# Patient Record
Sex: MALE | Race: WHITE | Employment: OTHER | URBAN - METROPOLITAN AREA
[De-identification: names, ages, dates, MRNs, and addresses within clinical notes are randomized per-mention and may not be internally consistent; named-entity substitution may affect disease eponyms.]

---

## 2022-03-30 ENCOUNTER — OFFICE VISIT (OUTPATIENT)
Dept: NEUROLOGY | Age: 67
End: 2022-03-30
Payer: MEDICARE

## 2022-03-30 VITALS
DIASTOLIC BLOOD PRESSURE: 61 MMHG | HEIGHT: 68 IN | WEIGHT: 145 LBS | SYSTOLIC BLOOD PRESSURE: 99 MMHG | BODY MASS INDEX: 21.98 KG/M2 | HEART RATE: 68 BPM

## 2022-03-30 DIAGNOSIS — M79.2 NEUROPATHIC PAIN: Primary | ICD-10-CM

## 2022-03-30 DIAGNOSIS — R20.0 BILATERAL NUMBNESS AND TINGLING OF ARMS AND LEGS: ICD-10-CM

## 2022-03-30 DIAGNOSIS — R20.2 BILATERAL NUMBNESS AND TINGLING OF ARMS AND LEGS: ICD-10-CM

## 2022-03-30 PROCEDURE — 4040F PNEUMOC VAC/ADMIN/RCVD: CPT | Performed by: PSYCHIATRY & NEUROLOGY

## 2022-03-30 PROCEDURE — G8484 FLU IMMUNIZE NO ADMIN: HCPCS | Performed by: PSYCHIATRY & NEUROLOGY

## 2022-03-30 PROCEDURE — G8420 CALC BMI NORM PARAMETERS: HCPCS | Performed by: PSYCHIATRY & NEUROLOGY

## 2022-03-30 PROCEDURE — 4004F PT TOBACCO SCREEN RCVD TLK: CPT | Performed by: PSYCHIATRY & NEUROLOGY

## 2022-03-30 PROCEDURE — G8427 DOCREV CUR MEDS BY ELIG CLIN: HCPCS | Performed by: PSYCHIATRY & NEUROLOGY

## 2022-03-30 PROCEDURE — 99204 OFFICE O/P NEW MOD 45 MIN: CPT | Performed by: PSYCHIATRY & NEUROLOGY

## 2022-03-30 PROCEDURE — 1123F ACP DISCUSS/DSCN MKR DOCD: CPT | Performed by: PSYCHIATRY & NEUROLOGY

## 2022-03-30 PROCEDURE — 3017F COLORECTAL CA SCREEN DOC REV: CPT | Performed by: PSYCHIATRY & NEUROLOGY

## 2022-03-30 RX ORDER — MAGNESIUM OXIDE 400 MG/1
400 TABLET ORAL DAILY
COMMUNITY

## 2022-03-30 RX ORDER — ASPIRIN 81 MG/1
81 TABLET ORAL DAILY
COMMUNITY

## 2022-03-30 RX ORDER — PREGABALIN 150 MG/1
CAPSULE ORAL
COMMUNITY
Start: 2022-02-28

## 2022-03-30 RX ORDER — HYDROCODONE BITARTRATE AND ACETAMINOPHEN 10; 325 MG/1; MG/1
TABLET ORAL
COMMUNITY
Start: 2022-03-15

## 2022-03-30 RX ORDER — CALCIUM CARBONATE 500(1250)
500 TABLET ORAL DAILY
COMMUNITY

## 2022-03-30 RX ORDER — DULOXETIN HYDROCHLORIDE 30 MG/1
CAPSULE, DELAYED RELEASE ORAL
COMMUNITY
Start: 2021-11-02

## 2022-03-30 NOTE — PROGRESS NOTES
NEUROLOGY CONSULTATION     Chief Complaint   Patient presents with    New Patient     numbness & tingling        HISTORY OF PRESENT ILLNESS :    Marletta Boas is a 78 y.o. male who is referred by Dr. Christy Odor   History was obtained from patient  Additional history was obtained from his family. Patient was referred for evaluation of tingling numbness as well as pain in his arms and legs. Patient states that he had lumbar disc surgery done about 3 years ago. After the surgery patient developed significant tingling and numbness in his legs which has now spread all over the body. He also has significant chronic pain. Patient has been seen by pain management and is on pain medications. He was started on Lyrica which does not seem to help. Patient feels that the pain pills helped to some extent. Patient states that he has had extensive testing done including MRI brain and lumbar spine as well as lab work and they have not been able to find any definite etiology.     REVIEW OF SYSTEMS    Constitutional:  []   Chills   []  Fatigue   []  Fevers   []  Malaise   []  Weight loss     [x] Denies all of the above    Eyes:  []  Double vision   [x]  Blurry vision     [] Denies all of the above    Ears, nose, mouth, throat, and face:   [x] Hearing loss    []   Hoarseness      []  Snoring    []  Tinnitus       [] Denies all of the above     Respiratory:   []  Cough    [x]  Shortness of breath         [] Denies all of the above     Cardiovascular:   []  Chest pain    []  Exertional chest pressure/discomfort           [] Palpitations    []  Syncope     [x] Denies all of the above    Gastrointestinal:   []  Abdominal pain   [x]  Constipation    []  Diarrhea    [x]   Dysphagia                      [] Denies all of the above    Genitourinary:      []  Frequency   []  Hematuria     []  Urinary incontinence           [x] Denies all of the above Status: Not on file   Intimate Partner Violence:     Fear of Current or Ex-Partner: Not on file    Emotionally Abused: Not on file    Physically Abused: Not on file    Sexually Abused: Not on file   Housing Stability:     Unable to Pay for Housing in the Last Year: Not on file    Number of [place] in the Last Year: Not on file    Unstable Housing in the Last Year: Not on file       PHYSICAL EXAMINATION:  BP 99/61   Pulse 68   Ht 5' 8\" (1.727 m)   Wt 145 lb (65.8 kg)   BMI 22.05 kg/m²   Appearance: Well appearing, well nourished and in no distress  Mental Status Exam: Patient is alert, oriented to person, place and time. Recent and remote memory is normal  Fund of Knowledge is normal  Attention/concentration is normal.   Speech : No dysarthria  Language : No aphasia  Funduscopic Exam: sharp disc margins  Cranial Nerves:   II: Visual fields:  Full to confrontation  III: Pupils:  equal, round, reactive to light  III,IV,VI: Extra Ocular Movements are intact. No nystagmus  V: Facial sensation is intact to pin prick and light touch  VII: Facial strength and movements: intact and symmetric smile,cheek puffing and eyebrow elevation  VIII: Hearing:  Intact to finger rub bilaterally  IX: Palate  elevation is symmetric  XI: Shoulder shrug is intact  XII: Tongue movements are normal  Motor:  Muscle tone and bulk are normal.   Strength is symmetrical 5/5 in all four extremities. Sensory: Vague nondermatomal sensory loss in the arms and legs  Coordination:  Normal  Finger to Nose and Heel to Shin bilaterally    . Reflexes:  DTR +2 and symmetric bilaterally in the arms and the knees and the 1 at the ankles. Plantar response: Flexor bilaterally  Gait: Gait and station is normal. Patient can toe/ heel and tandem walk without difficulty  Romberg: negative  Vascular: No carotid bruit bilaterally        DATA:  MRI lumbar spine done at [facility] showed lumbar spondylosis.   Lab work done at [facility] and

## 2022-05-11 ENCOUNTER — PROCEDURE VISIT (OUTPATIENT)
Dept: NEUROLOGY | Age: 67
End: 2022-05-11
Payer: MEDICARE

## 2022-05-11 ENCOUNTER — OFFICE VISIT (OUTPATIENT)
Dept: NEUROLOGY | Age: 67
End: 2022-05-11

## 2022-05-11 VITALS
WEIGHT: 145 LBS | DIASTOLIC BLOOD PRESSURE: 71 MMHG | HEIGHT: 68 IN | SYSTOLIC BLOOD PRESSURE: 119 MMHG | HEART RATE: 60 BPM | BODY MASS INDEX: 21.98 KG/M2

## 2022-05-11 DIAGNOSIS — M79.2 NEUROPATHIC PAIN: Primary | ICD-10-CM

## 2022-05-11 DIAGNOSIS — R20.2 BILATERAL NUMBNESS AND TINGLING OF ARMS AND LEGS: Primary | ICD-10-CM

## 2022-05-11 DIAGNOSIS — R20.0 BILATERAL NUMBNESS AND TINGLING OF ARMS AND LEGS: Primary | ICD-10-CM

## 2022-05-11 PROCEDURE — 3017F COLORECTAL CA SCREEN DOC REV: CPT | Performed by: PSYCHIATRY & NEUROLOGY

## 2022-05-11 PROCEDURE — G8427 DOCREV CUR MEDS BY ELIG CLIN: HCPCS | Performed by: PSYCHIATRY & NEUROLOGY

## 2022-05-11 PROCEDURE — 4004F PT TOBACCO SCREEN RCVD TLK: CPT | Performed by: PSYCHIATRY & NEUROLOGY

## 2022-05-11 PROCEDURE — 4040F PNEUMOC VAC/ADMIN/RCVD: CPT | Performed by: PSYCHIATRY & NEUROLOGY

## 2022-05-11 PROCEDURE — 95886 MUSC TEST DONE W/N TEST COMP: CPT | Performed by: PSYCHIATRY & NEUROLOGY

## 2022-05-11 PROCEDURE — 99213 OFFICE O/P EST LOW 20 MIN: CPT | Performed by: PSYCHIATRY & NEUROLOGY

## 2022-05-11 PROCEDURE — G8420 CALC BMI NORM PARAMETERS: HCPCS | Performed by: PSYCHIATRY & NEUROLOGY

## 2022-05-11 PROCEDURE — 95911 NRV CNDJ TEST 9-10 STUDIES: CPT | Performed by: PSYCHIATRY & NEUROLOGY

## 2022-05-11 PROCEDURE — 1123F ACP DISCUSS/DSCN MKR DOCD: CPT | Performed by: PSYCHIATRY & NEUROLOGY

## 2022-05-11 NOTE — PROGRESS NOTES
Rei Shafer M.D. [facility]) Physicians/[facility]  Board Certified in [address] [facility] [address] [facility], [address]    EMG / NERVE CONDUCTION STUDY      PATIENT:  Zak Chávez       DATE OF EM22     YOB: 1955       REASON FOR EMG:   Tingling and numbness in arms and legs along with pain in both arms and legs, rule out peripheral neuropathy      REFERRING PHYSICIAN:  Rei Shafer MD  [address] [place] [address], [address] [facility],  [address] [facility]     SUMMARY:   Bilateral peroneal motor nerve studies were normal.  Bilateral posterior tibial motor nerve studies were normal.  Bilateral sural sensory nerve studies were normal.  The left median and ulnar motor nerve studies were normal.  The left median and ulnar sensory nerve studies were normal.  Needle EMG of several muscles in both lower extremities and the left upper extremity was normal.      CLINICAL DIAGNOSIS:  Peripheral neuropathy        EMG RESULTS:   This is a normal EMG and nerve conduction study of both lower extremities and the left upper extremity. There is no electrophysiological evidence for large fiber peripheral neuropathy in this study. ---------------------------------------------  Rei Shafer M.D.   Electromyographer / Neurologist

## 2022-05-11 NOTE — PATIENT INSTRUCTIONS
Verbal consent was obtained from patient and/or patient's advocate for in office procedure with Dr. Carmen Moscoso (EMG or EEG).

## 2022-05-11 NOTE — PROGRESS NOTES
Alexx Kenny   Neurology followup    Subjective:   CC/HP  History was obtained from the patient. Additional history was obtained from his wife. Patient continues to have pain tingling and numbness in his arms and legs. He is here for a follow-up visit and EMG studies. Background history:  Patient was referred for evaluation of tingling numbness as well as pain in his arms and legs. Patient states that he had lumbar disc surgery done about 3 years ago. After the surgery patient developed significant tingling and numbness in his legs which has now spread all over the body. He also has significant chronic pain. Patient has been seen by pain management and is on pain medications. He was started on Lyrica which does not seem to help. Patient feels that the pain pills helped to some extent. Patient states that he has had extensive testing done including MRI brain and lumbar spine as well as lab work and they have not been able to find any definite etiology.     REVIEW OF SYSTEMS    Constitutional:  []   Chills   [x]  Fatigue   []  Fevers   []  Malaise   []  Weight loss     [] Denies all of the above    Respiratory:   []  Cough    [x]  Shortness of breath         [] Denies all of the above     Cardiovascular:   []  Chest pain    []  Exertional chest pressure/discomfort           [] Palpitations    []  Syncope     [x] Denies all of the above        Past Medical History:   Diagnosis Date    CNS demyelinating disease ([facility] [address]     Degenerative disc disease, lumbar     Intervertebral disc disorder     Peripheral arterial occlusive disease (HCC)     Proximal weakness of extremity     Sciatica of right side     Spinal stenosis of lumbar region at multiple levels     Spondylolisthesis at L5-S1 level     Tremor      Family History   Family history unknown: Yes     Social History     Socioeconomic History    Marital status:      Spouse name: Not on file    Number of children: Not on file    Years of education: Not on file    Highest education level: Not on file   Occupational History    Not on file   Tobacco Use    Smoking status: Current Every Day Smoker    Smokeless tobacco: Never Used   Vaping Use    Vaping Use: Never used   Substance and Sexual Activity    Alcohol use: Not Currently    Drug use: Never    Sexual activity: Yes     Partners: Female   Other Topics Concern    Not on file   Social History Narrative    Not on file     Social Determinants of Health     Financial Resource Strain:     Difficulty of Paying Living Expenses: Not on file   Food Insecurity:     Worried About Running Out of Food in the Last Year: Not on file    John of Food in the Last Year: Not on file   Transportation Needs:     Lack of Transportation (Medical): Not on file    Lack of Transportation (Non-Medical): Not on file   Physical Activity:     Days of Exercise per Week: Not on file    Minutes of Exercise per Session: Not on file   Stress:     Feeling of Stress : Not on file   Social Connections:     Frequency of Communication with Friends and Family: Not on file    Frequency of Social Gatherings with Friends and Family: Not on file    Attends Buddhist Services: Not on file    Active Member of [address] [place] [address] or Organizations: Not on file    Attends Club or Organization Meetings: Not on file    Marital Status: Not on file   Intimate Partner Violence:     Fear of Current or Ex-Partner: Not on file    Emotionally Abused: Not on file    Physically Abused: Not on file    Sexually Abused: Not on file   Housing Stability:     Unable to Pay for Housing in the Last Year: Not on file    Number of [place] in the Last Year: Not on file    Unstable Housing in the Last Year: Not on file        Objective:  Exam:  /71   Pulse 60   Ht 5' 8\" (1.727 m)   Wt 145 lb (65.8 kg)   BMI 22.05 kg/m²   This is a well-nourished patient in no acute distress  Patient is awake, alert and oriented x3.  Speech is normal.  Pupils are equal round reacting to light. Extraocular movements intact. Face symmetrical. Tongue midline. Motor exam shows normal symmetrical strength. Deep tendon reflexes normal. Plantar reflexes downgoing. Sensory exam shows vague nondermatomal sensory loss in the arms and legs. Coordination normal. Gait normal. No carotid bruit. No neck stiffness. Data :  MRI lumbar spine done at [facility] showed lumbar spondylosis. Lab work done at [facility] and [facility] were normal  MRI brain done at [facility] showed minor nonspecific white matter changes. Impression :  Chronic pain syndrome, unclear etiology  EMG and nerve conduction studies of both legs and 1 upper extremity was normal without any evidence of large fiber peripheral neuropathy. MRI lumbar spine showed lumbar spondylosis. Patient has had previous lumbar surgery  MRI brain showed minimal nonspecific white matter changes. Plan :  Discussed with patient and his wife  Explained that EMG does not show any evidence of large fiber peripheral neuropathy. They are obviously frustrated that there is no definite diagnosis. We discussed about even getting another opinion at [facility] or similar tertiary care center. Patient will discuss this more with his primary physician. I will see him on a as needed basis. Please note a portion of  this chart was generated using dragon dictation software. Although every effort was made to ensure the accuracy of this automated transcription, some errors in transcription may have occurred.

## 2022-08-11 ENCOUNTER — HOSPITAL ENCOUNTER (OUTPATIENT)
Dept: GENERAL RADIOLOGY | Age: 67
Discharge: HOME | End: 2022-08-11
Payer: MEDICARE

## 2022-08-11 ENCOUNTER — HOSPITAL ENCOUNTER (OUTPATIENT)
Age: 67
Discharge: HOME | End: 2022-08-11
Payer: MEDICARE

## 2022-08-11 DIAGNOSIS — R29.2 HYPERREFLEXIC: ICD-10-CM

## 2022-08-11 PROCEDURE — 72050 X-RAY EXAM NECK SPINE 4/5VWS: CPT

## 2023-05-18 LAB — HIV AG/AB: NONREACTIVE

## 2024-03-19 ENCOUNTER — OFFICE VISIT (OUTPATIENT)
Dept: FAMILY MEDICINE CLINIC | Age: 69
End: 2024-03-19
Payer: MEDICARE

## 2024-03-19 VITALS
HEIGHT: 68 IN | BODY MASS INDEX: 18.91 KG/M2 | OXYGEN SATURATION: 94 % | HEART RATE: 98 BPM | DIASTOLIC BLOOD PRESSURE: 62 MMHG | SYSTOLIC BLOOD PRESSURE: 108 MMHG | WEIGHT: 124.8 LBS

## 2024-03-19 DIAGNOSIS — M48.02 FORAMINAL STENOSIS OF CERVICAL REGION: ICD-10-CM

## 2024-03-19 DIAGNOSIS — E87.1 HYPONATREMIA: ICD-10-CM

## 2024-03-19 DIAGNOSIS — Z12.11 SCREENING FOR COLON CANCER: ICD-10-CM

## 2024-03-19 DIAGNOSIS — R41.3 MEMORY PROBLEM: ICD-10-CM

## 2024-03-19 DIAGNOSIS — J43.9 PULMONARY EMPHYSEMA, UNSPECIFIED EMPHYSEMA TYPE (HCC): ICD-10-CM

## 2024-03-19 DIAGNOSIS — Z91.81 AT HIGH RISK FOR FALLS: ICD-10-CM

## 2024-03-19 DIAGNOSIS — D64.9 ANEMIA, UNSPECIFIED TYPE: ICD-10-CM

## 2024-03-19 DIAGNOSIS — I67.2 CEREBRAL ATHEROSCLEROSIS: ICD-10-CM

## 2024-03-19 DIAGNOSIS — R20.2 PARESTHESIA OF UPPER AND LOWER EXTREMITIES OF BOTH SIDES: Primary | ICD-10-CM

## 2024-03-19 DIAGNOSIS — R73.01 IMPAIRED FASTING BLOOD SUGAR: ICD-10-CM

## 2024-03-19 PROBLEM — M51.17 INTERVERTEBRAL DISC DISORDERS WITH RADICULOPATHY, LUMBOSACRAL REGION: Status: ACTIVE | Noted: 2019-06-20

## 2024-03-19 PROBLEM — I67.89 CEREBRAL MICROVASCULAR DISEASE: Chronic | Status: ACTIVE | Noted: 2022-07-28

## 2024-03-19 PROBLEM — G89.4 CHRONIC PAIN DISORDER: Status: ACTIVE | Noted: 2022-09-01

## 2024-03-19 LAB
ALBUMIN SERPL-MCNC: 4.5 G/DL (ref 3.4–5)
ALBUMIN/GLOB SERPL: 2 {RATIO} (ref 1.1–2.2)
ALP SERPL-CCNC: 74 U/L (ref 40–129)
ALT SERPL-CCNC: 43 U/L (ref 10–40)
ANION GAP SERPL CALCULATED.3IONS-SCNC: 16 MMOL/L (ref 3–16)
AST SERPL-CCNC: 30 U/L (ref 15–37)
BASOPHILS # BLD: 0 K/UL (ref 0–0.2)
BASOPHILS NFR BLD: 0.6 %
BILIRUB SERPL-MCNC: 0.5 MG/DL (ref 0–1)
BUN SERPL-MCNC: 22 MG/DL (ref 7–20)
CALCIUM SERPL-MCNC: 9.6 MG/DL (ref 8.3–10.6)
CHLORIDE SERPL-SCNC: 97 MMOL/L (ref 99–110)
CHOLEST SERPL-MCNC: 177 MG/DL (ref 0–199)
CO2 SERPL-SCNC: 24 MMOL/L (ref 21–32)
CREAT SERPL-MCNC: 0.7 MG/DL (ref 0.8–1.3)
DEPRECATED RDW RBC AUTO: 14.2 % (ref 12.4–15.4)
EOSINOPHIL # BLD: 0 K/UL (ref 0–0.6)
EOSINOPHIL NFR BLD: 0.1 %
FOLATE SERPL-MCNC: 16.01 NG/ML (ref 4.78–24.2)
GFR SERPLBLD CREATININE-BSD FMLA CKD-EPI: >60 ML/MIN/{1.73_M2}
GLUCOSE SERPL-MCNC: 102 MG/DL (ref 70–99)
HCT VFR BLD AUTO: 45.7 % (ref 40.5–52.5)
HDLC SERPL-MCNC: 55 MG/DL (ref 40–60)
HGB BLD-MCNC: 15.6 G/DL (ref 13.5–17.5)
IRON SATN MFR SERPL: 44 % (ref 20–50)
IRON SERPL-MCNC: 115 UG/DL (ref 59–158)
LDLC SERPL CALC-MCNC: 103 MG/DL
LYMPHOCYTES # BLD: 1.8 K/UL (ref 1–5.1)
LYMPHOCYTES NFR BLD: 20.8 %
MCH RBC QN AUTO: 31.6 PG (ref 26–34)
MCHC RBC AUTO-ENTMCNC: 34.1 G/DL (ref 31–36)
MCV RBC AUTO: 92.7 FL (ref 80–100)
MONOCYTES # BLD: 0.5 K/UL (ref 0–1.3)
MONOCYTES NFR BLD: 5.6 %
NEUTROPHILS # BLD: 6.3 K/UL (ref 1.7–7.7)
NEUTROPHILS NFR BLD: 72.9 %
PLATELET # BLD AUTO: 220 K/UL (ref 135–450)
PMV BLD AUTO: 10.5 FL (ref 5–10.5)
POTASSIUM SERPL-SCNC: 5.3 MMOL/L (ref 3.5–5.1)
PROT SERPL-MCNC: 6.7 G/DL (ref 6.4–8.2)
RBC # BLD AUTO: 4.93 M/UL (ref 4.2–5.9)
SODIUM SERPL-SCNC: 137 MMOL/L (ref 136–145)
TIBC SERPL-MCNC: 263 UG/DL (ref 260–445)
TRIGL SERPL-MCNC: 93 MG/DL (ref 0–150)
TSH SERPL DL<=0.005 MIU/L-ACNC: 0.74 UIU/ML (ref 0.27–4.2)
VIT B12 SERPL-MCNC: 1316 PG/ML (ref 211–911)
VLDLC SERPL CALC-MCNC: 19 MG/DL
WBC # BLD AUTO: 8.7 K/UL (ref 4–11)

## 2024-03-19 PROCEDURE — G8427 DOCREV CUR MEDS BY ELIG CLIN: HCPCS | Performed by: STUDENT IN AN ORGANIZED HEALTH CARE EDUCATION/TRAINING PROGRAM

## 2024-03-19 PROCEDURE — G8482 FLU IMMUNIZE ORDER/ADMIN: HCPCS | Performed by: STUDENT IN AN ORGANIZED HEALTH CARE EDUCATION/TRAINING PROGRAM

## 2024-03-19 PROCEDURE — 1123F ACP DISCUSS/DSCN MKR DOCD: CPT | Performed by: STUDENT IN AN ORGANIZED HEALTH CARE EDUCATION/TRAINING PROGRAM

## 2024-03-19 PROCEDURE — 99204 OFFICE O/P NEW MOD 45 MIN: CPT | Performed by: STUDENT IN AN ORGANIZED HEALTH CARE EDUCATION/TRAINING PROGRAM

## 2024-03-19 PROCEDURE — G8420 CALC BMI NORM PARAMETERS: HCPCS | Performed by: STUDENT IN AN ORGANIZED HEALTH CARE EDUCATION/TRAINING PROGRAM

## 2024-03-19 PROCEDURE — 3023F SPIROM DOC REV: CPT | Performed by: STUDENT IN AN ORGANIZED HEALTH CARE EDUCATION/TRAINING PROGRAM

## 2024-03-19 PROCEDURE — 4004F PT TOBACCO SCREEN RCVD TLK: CPT | Performed by: STUDENT IN AN ORGANIZED HEALTH CARE EDUCATION/TRAINING PROGRAM

## 2024-03-19 PROCEDURE — 36415 COLL VENOUS BLD VENIPUNCTURE: CPT | Performed by: STUDENT IN AN ORGANIZED HEALTH CARE EDUCATION/TRAINING PROGRAM

## 2024-03-19 PROCEDURE — 3017F COLORECTAL CA SCREEN DOC REV: CPT | Performed by: STUDENT IN AN ORGANIZED HEALTH CARE EDUCATION/TRAINING PROGRAM

## 2024-03-19 RX ORDER — ALBUTEROL SULFATE 90 UG/1
2 AEROSOL, METERED RESPIRATORY (INHALATION) 4 TIMES DAILY PRN
Qty: 18 G | Refills: 0 | Status: SHIPPED | OUTPATIENT
Start: 2024-03-19

## 2024-03-19 SDOH — ECONOMIC STABILITY: FOOD INSECURITY: WITHIN THE PAST 12 MONTHS, THE FOOD YOU BOUGHT JUST DIDN'T LAST AND YOU DIDN'T HAVE MONEY TO GET MORE.: NEVER TRUE

## 2024-03-19 SDOH — HEALTH STABILITY: PHYSICAL HEALTH: ON AVERAGE, HOW MANY DAYS PER WEEK DO YOU ENGAGE IN MODERATE TO STRENUOUS EXERCISE (LIKE A BRISK WALK)?: 0 DAYS

## 2024-03-19 SDOH — ECONOMIC STABILITY: INCOME INSECURITY: HOW HARD IS IT FOR YOU TO PAY FOR THE VERY BASICS LIKE FOOD, HOUSING, MEDICAL CARE, AND HEATING?: NOT HARD AT ALL

## 2024-03-19 SDOH — ECONOMIC STABILITY: FOOD INSECURITY: WITHIN THE PAST 12 MONTHS, YOU WORRIED THAT YOUR FOOD WOULD RUN OUT BEFORE YOU GOT MONEY TO BUY MORE.: NEVER TRUE

## 2024-03-19 SDOH — ECONOMIC STABILITY: HOUSING INSECURITY
IN THE LAST 12 MONTHS, WAS THERE A TIME WHEN YOU DID NOT HAVE A STEADY PLACE TO SLEEP OR SLEPT IN A SHELTER (INCLUDING NOW)?: NO

## 2024-03-19 SDOH — HEALTH STABILITY: PHYSICAL HEALTH: ON AVERAGE, HOW MANY MINUTES DO YOU ENGAGE IN EXERCISE AT THIS LEVEL?: 0 MIN

## 2024-03-19 ASSESSMENT — PATIENT HEALTH QUESTIONNAIRE - PHQ9
SUM OF ALL RESPONSES TO PHQ QUESTIONS 1-9: 24
8. MOVING OR SPEAKING SO SLOWLY THAT OTHER PEOPLE COULD HAVE NOTICED. OR THE OPPOSITE, BEING SO FIGETY OR RESTLESS THAT YOU HAVE BEEN MOVING AROUND A LOT MORE THAN USUAL: NEARLY EVERY DAY
2. FEELING DOWN, DEPRESSED OR HOPELESS: NEARLY EVERY DAY
1. LITTLE INTEREST OR PLEASURE IN DOING THINGS: NEARLY EVERY DAY
9. THOUGHTS THAT YOU WOULD BE BETTER OFF DEAD, OR OF HURTING YOURSELF: NOT AT ALL
SUM OF ALL RESPONSES TO PHQ QUESTIONS 1-9: 24
5. POOR APPETITE OR OVEREATING: NEARLY EVERY DAY
3. TROUBLE FALLING OR STAYING ASLEEP: NEARLY EVERY DAY
10. IF YOU CHECKED OFF ANY PROBLEMS, HOW DIFFICULT HAVE THESE PROBLEMS MADE IT FOR YOU TO DO YOUR WORK, TAKE CARE OF THINGS AT HOME, OR GET ALONG WITH OTHER PEOPLE: VERY DIFFICULT
SUM OF ALL RESPONSES TO PHQ QUESTIONS 1-9: 24
SUM OF ALL RESPONSES TO PHQ9 QUESTIONS 1 & 2: 6
6. FEELING BAD ABOUT YOURSELF - OR THAT YOU ARE A FAILURE OR HAVE LET YOURSELF OR YOUR FAMILY DOWN: NEARLY EVERY DAY
7. TROUBLE CONCENTRATING ON THINGS, SUCH AS READING THE NEWSPAPER OR WATCHING TELEVISION: NEARLY EVERY DAY
4. FEELING TIRED OR HAVING LITTLE ENERGY: NEARLY EVERY DAY
SUM OF ALL RESPONSES TO PHQ QUESTIONS 1-9: 24

## 2024-03-19 ASSESSMENT — COLUMBIA-SUICIDE SEVERITY RATING SCALE - C-SSRS
1. WITHIN THE PAST MONTH, HAVE YOU WISHED YOU WERE DEAD OR WISHED YOU COULD GO TO SLEEP AND NOT WAKE UP?: NO
2. HAVE YOU ACTUALLY HAD ANY THOUGHTS OF KILLING YOURSELF?: NO
6. HAVE YOU EVER DONE ANYTHING, STARTED TO DO ANYTHING, OR PREPARED TO DO ANYTHING TO END YOUR LIFE?: NO

## 2024-03-19 ASSESSMENT — ENCOUNTER SYMPTOMS
COUGH: 0
BLOOD IN STOOL: 0
RHINORRHEA: 0
SHORTNESS OF BREATH: 1

## 2024-03-19 NOTE — PROGRESS NOTES
[facility]  Establish care visit   3/19/2024    Ernesto Candelaria (:  1955) is a 68 y.o. male, here to establish care.    Chief Complaint   Patient presents with    New Patient     Patient here to est care         ASSESSMENT/ PLAN  1. Paresthesia of upper and lower extremities of both sides  -Referral to neurology.  Has appointment with neurology with PCP but not until August.  Also give referral to neurosurgery as he has cervical foraminal stenosis recent which could be contributing.  - [facility] ([facility]) - Chito Reyes MD, Pain Medicine, [facility]  - [facility] - Nehal Walker MD, Neurology, [facility]  - CBC with Auto Differential; Future  - Comprehensive Metabolic Panel; Future  - LIPID PANEL; Future  - Hemoglobin A1C; Future  - TSH with Reflex; Future  - Iron and TIBC; Future  - Vitamin B12 & Folate; Future  - [facility] - Blaine Mcintyer MD, Neurosurgery (Spine, Brain Tumor), [facility]  - Vitamin B12 & Folate  - Iron and TIBC  - TSH with Reflex  - Hemoglobin A1C  - LIPID PANEL  - Comprehensive Metabolic Panel  - CBC with Auto Differential    2. At high risk for falls  -Recommend referral to neurology and neurosurgery for further evaluation.  - CBC with Auto Differential; Future  - Comprehensive Metabolic Panel; Future  - LIPID PANEL; Future  - Hemoglobin A1C; Future  - TSH with Reflex; Future  - Iron and TIBC; Future  - Vitamin B12 & Folate; Future  - Vitamin B12 & Folate  - Iron and TIBC  - TSH with Reflex  - Hemoglobin A1C  - LIPID PANEL  - Comprehensive Metabolic Panel  - CBC with Auto Differential    3. Memory problem  -Referral to neurology.  Labs to rule out secondary causes  - Nehal Al MD, Neurology, [facility]  - CBC with Auto Differential; Future  - Comprehensive Metabolic Panel; Future  - LIPID PANEL; Future  - Hemoglobin A1C; Future  - TSH with Reflex; Future  - Iron and TIBC; Future  - Vitamin B12 & Folate; Future  - Vitamin B12

## 2024-03-20 LAB
EST. AVERAGE GLUCOSE BLD GHB EST-MCNC: 99.7 MG/DL
HBA1C MFR BLD: 5.1 %

## 2024-04-22 ENCOUNTER — OFFICE VISIT (OUTPATIENT)
Dept: FAMILY MEDICINE CLINIC | Age: 69
End: 2024-04-22
Payer: MEDICARE

## 2024-04-22 VITALS
WEIGHT: 136.6 LBS | DIASTOLIC BLOOD PRESSURE: 82 MMHG | SYSTOLIC BLOOD PRESSURE: 114 MMHG | OXYGEN SATURATION: 95 % | BODY MASS INDEX: 20.77 KG/M2 | HEART RATE: 73 BPM

## 2024-04-22 DIAGNOSIS — E87.5 HYPERKALEMIA: ICD-10-CM

## 2024-04-22 DIAGNOSIS — Z12.11 SCREENING FOR COLON CANCER: ICD-10-CM

## 2024-04-22 DIAGNOSIS — F33.1 DEPRESSION, MAJOR, RECURRENT, MODERATE (HCC): Primary | ICD-10-CM

## 2024-04-22 DIAGNOSIS — K59.00 CONSTIPATION, UNSPECIFIED CONSTIPATION TYPE: ICD-10-CM

## 2024-04-22 PROCEDURE — G8427 DOCREV CUR MEDS BY ELIG CLIN: HCPCS | Performed by: STUDENT IN AN ORGANIZED HEALTH CARE EDUCATION/TRAINING PROGRAM

## 2024-04-22 PROCEDURE — 99214 OFFICE O/P EST MOD 30 MIN: CPT | Performed by: STUDENT IN AN ORGANIZED HEALTH CARE EDUCATION/TRAINING PROGRAM

## 2024-04-22 PROCEDURE — 4004F PT TOBACCO SCREEN RCVD TLK: CPT | Performed by: STUDENT IN AN ORGANIZED HEALTH CARE EDUCATION/TRAINING PROGRAM

## 2024-04-22 PROCEDURE — 1123F ACP DISCUSS/DSCN MKR DOCD: CPT | Performed by: STUDENT IN AN ORGANIZED HEALTH CARE EDUCATION/TRAINING PROGRAM

## 2024-04-22 PROCEDURE — G8420 CALC BMI NORM PARAMETERS: HCPCS | Performed by: STUDENT IN AN ORGANIZED HEALTH CARE EDUCATION/TRAINING PROGRAM

## 2024-04-22 PROCEDURE — 3017F COLORECTAL CA SCREEN DOC REV: CPT | Performed by: STUDENT IN AN ORGANIZED HEALTH CARE EDUCATION/TRAINING PROGRAM

## 2024-04-22 RX ORDER — DULOXETIN HYDROCHLORIDE 60 MG/1
60 CAPSULE, DELAYED RELEASE ORAL DAILY
COMMUNITY

## 2024-04-22 RX ORDER — ARIPIPRAZOLE 2 MG/1
2 TABLET ORAL DAILY
Qty: 30 TABLET | Refills: 0 | Status: SHIPPED | OUTPATIENT
Start: 2024-04-22

## 2024-04-22 ASSESSMENT — PATIENT HEALTH QUESTIONNAIRE - PHQ9
SUM OF ALL RESPONSES TO PHQ QUESTIONS 1-9: 15
6. FEELING BAD ABOUT YOURSELF - OR THAT YOU ARE A FAILURE OR HAVE LET YOURSELF OR YOUR FAMILY DOWN: NEARLY EVERY DAY
1. LITTLE INTEREST OR PLEASURE IN DOING THINGS: NEARLY EVERY DAY
10. IF YOU CHECKED OFF ANY PROBLEMS, HOW DIFFICULT HAVE THESE PROBLEMS MADE IT FOR YOU TO DO YOUR WORK, TAKE CARE OF THINGS AT HOME, OR GET ALONG WITH OTHER PEOPLE: VERY DIFFICULT
9. THOUGHTS THAT YOU WOULD BE BETTER OFF DEAD, OR OF HURTING YOURSELF: NOT AT ALL
SUM OF ALL RESPONSES TO PHQ QUESTIONS 1-9: 15
7. TROUBLE CONCENTRATING ON THINGS, SUCH AS READING THE NEWSPAPER OR WATCHING TELEVISION: NOT AT ALL
3. TROUBLE FALLING OR STAYING ASLEEP: NOT AT ALL
SUM OF ALL RESPONSES TO PHQ QUESTIONS 1-9: 15
2. FEELING DOWN, DEPRESSED OR HOPELESS: NEARLY EVERY DAY
4. FEELING TIRED OR HAVING LITTLE ENERGY: NEARLY EVERY DAY
SUM OF ALL RESPONSES TO PHQ QUESTIONS 1-9: 15
5. POOR APPETITE OR OVEREATING: NOT AT ALL
SUM OF ALL RESPONSES TO PHQ9 QUESTIONS 1 & 2: 6
8. MOVING OR SPEAKING SO SLOWLY THAT OTHER PEOPLE COULD HAVE NOTICED. OR THE OPPOSITE, BEING SO FIGETY OR RESTLESS THAT YOU HAVE BEEN MOVING AROUND A LOT MORE THAN USUAL: NEARLY EVERY DAY

## 2024-04-22 ASSESSMENT — ENCOUNTER SYMPTOMS
BLOOD IN STOOL: 0
COUGH: 0
SHORTNESS OF BREATH: 0
RHINORRHEA: 0

## 2024-04-22 NOTE — PROGRESS NOTES
[facility]  2024    Ernesto Candelaria (:  1955) is a 69 y.o. male, here for evaluation of the following medical concerns:    Chief Complaint   Patient presents with    1 Month Follow-Up        ASSESSMENT/ PLAN  1. Depression, major, recurrent, moderate (HCC)  -Add Abilify and follow-up in 1 month.  - Basic Metabolic Panel; Future    2. Hyperkalemia  -Recheck potassium  - Basic Metabolic Panel; Future    3. Screening for colon cancer  - Fecal DNA Colorectal cancer screening (Cologuard)    4.  Constipation  - Likely drug-induced.  Add fiber and follow-up if not improving.  Recommend colonoscopy as patient is declining.  Will order Cologuard.       Return in about 1 month (around 2024) for Mood.    HPI  Patient is here for follow-up on hyperkalemia and depression.  His mood is still down most the time.  Mainly due to his health issues.  His pain he does not feel is well-controlled and lays in bed most of the day as it is the most alleviating position he can find.  He has had constipation since starting on Norco.  He has Linzess at home but he is not taking it regularly.  He has not tried fiber regularly yet.    ROS  Review of Systems   Constitutional:  Negative for chills and fever.   HENT:  Negative for rhinorrhea.    Respiratory:  Negative for cough and shortness of breath.    Cardiovascular:  Negative for chest pain.   Gastrointestinal:  Negative for blood in stool.       HISTORIES  Current Outpatient Medications on File Prior to Visit   Medication Sig Dispense Refill    DULoxetine (CYMBALTA) 60 MG extended release capsule Take 1 capsule by mouth daily      albuterol sulfate HFA (VENTOLIN HFA) 108 (90 Base) MCG/ACT inhaler Inhale 2 puffs into the lungs 4 times daily as needed for Wheezing 18 g 0    HYDROcodone-acetaminophen (NORCO)  MG per tablet TAKE 1 TABLET BY MOUTH THREE TIMES DAILY AS NEEDED      pregabalin (LYRICA) 150 MG capsule       aspirin 81 MG EC tablet Take 1

## 2024-05-20 RX ORDER — ARIPIPRAZOLE 2 MG/1
2 TABLET ORAL DAILY
Qty: 30 TABLET | Refills: 0 | Status: SHIPPED | OUTPATIENT
Start: 2024-05-20

## 2024-05-20 NOTE — TELEPHONE ENCOUNTER
Last Seen: Last Seen Department: 4/22/2024  Last Seen by PCP: 4/22/2024    Last Written: 04/22/2024      Future Appointments   Date Time Provider Department Center   7/29/2024 10:20 AM Nehal Walker MD [facility] Neurology -           Requested Prescriptions     Pending Prescriptions Disp Refills    ARIPiprazole (ABILIFY) 2 MG tablet 30 tablet 0     Sig: Take 1 tablet by mouth daily

## 2024-05-28 LAB — NONINV COLON CA DNA+OCC BLD SCRN STL QL: NEGATIVE

## 2024-06-18 ASSESSMENT — PATIENT HEALTH QUESTIONNAIRE - PHQ9
SUM OF ALL RESPONSES TO PHQ9 QUESTIONS 1 & 2: 0
1. LITTLE INTEREST OR PLEASURE IN DOING THINGS: NOT AT ALL
SUM OF ALL RESPONSES TO PHQ QUESTIONS 1-9: 0
2. FEELING DOWN, DEPRESSED OR HOPELESS: NOT AT ALL

## 2024-06-18 ASSESSMENT — LIFESTYLE VARIABLES
HOW MANY STANDARD DRINKS CONTAINING ALCOHOL DO YOU HAVE ON A TYPICAL DAY: PATIENT DOES NOT DRINK
HOW OFTEN DO YOU HAVE A DRINK CONTAINING ALCOHOL: NEVER
HOW MANY STANDARD DRINKS CONTAINING ALCOHOL DO YOU HAVE ON A TYPICAL DAY: 0
HOW OFTEN DO YOU HAVE A DRINK CONTAINING ALCOHOL: 1
HOW OFTEN DO YOU HAVE SIX OR MORE DRINKS ON ONE OCCASION: 1

## 2024-06-24 ENCOUNTER — OFFICE VISIT (OUTPATIENT)
Dept: FAMILY MEDICINE CLINIC | Age: 69
End: 2024-06-24
Payer: MEDICARE

## 2024-06-24 VITALS
OXYGEN SATURATION: 98 % | HEIGHT: 68 IN | SYSTOLIC BLOOD PRESSURE: 122 MMHG | DIASTOLIC BLOOD PRESSURE: 62 MMHG | WEIGHT: 149.2 LBS | HEART RATE: 61 BPM | BODY MASS INDEX: 22.61 KG/M2

## 2024-06-24 DIAGNOSIS — E87.5 HYPERKALEMIA: ICD-10-CM

## 2024-06-24 DIAGNOSIS — Z87.891 PERSONAL HISTORY OF TOBACCO USE: ICD-10-CM

## 2024-06-24 DIAGNOSIS — F33.1 DEPRESSION, MAJOR, RECURRENT, MODERATE (HCC): ICD-10-CM

## 2024-06-24 DIAGNOSIS — Z00.00 INITIAL MEDICARE ANNUAL WELLNESS VISIT: Primary | ICD-10-CM

## 2024-06-24 LAB
ANION GAP SERPL CALCULATED.3IONS-SCNC: 11 MMOL/L (ref 3–16)
BUN SERPL-MCNC: 18 MG/DL (ref 7–20)
CALCIUM SERPL-MCNC: 10 MG/DL (ref 8.3–10.6)
CHLORIDE SERPL-SCNC: 102 MMOL/L (ref 99–110)
CO2 SERPL-SCNC: 27 MMOL/L (ref 21–32)
CREAT SERPL-MCNC: 0.8 MG/DL (ref 0.8–1.3)
GFR SERPLBLD CREATININE-BSD FMLA CKD-EPI: >90 ML/MIN/{1.73_M2}
GLUCOSE SERPL-MCNC: 101 MG/DL (ref 70–99)
POTASSIUM SERPL-SCNC: 5.5 MMOL/L (ref 3.5–5.1)
SODIUM SERPL-SCNC: 140 MMOL/L (ref 136–145)

## 2024-06-24 PROCEDURE — 1123F ACP DISCUSS/DSCN MKR DOCD: CPT | Performed by: STUDENT IN AN ORGANIZED HEALTH CARE EDUCATION/TRAINING PROGRAM

## 2024-06-24 PROCEDURE — G0296 VISIT TO DETERM LDCT ELIG: HCPCS | Performed by: STUDENT IN AN ORGANIZED HEALTH CARE EDUCATION/TRAINING PROGRAM

## 2024-06-24 PROCEDURE — G0438 PPPS, INITIAL VISIT: HCPCS | Performed by: STUDENT IN AN ORGANIZED HEALTH CARE EDUCATION/TRAINING PROGRAM

## 2024-06-24 PROCEDURE — 36415 COLL VENOUS BLD VENIPUNCTURE: CPT | Performed by: STUDENT IN AN ORGANIZED HEALTH CARE EDUCATION/TRAINING PROGRAM

## 2024-06-24 PROCEDURE — 3017F COLORECTAL CA SCREEN DOC REV: CPT | Performed by: STUDENT IN AN ORGANIZED HEALTH CARE EDUCATION/TRAINING PROGRAM

## 2024-06-24 RX ORDER — ARIPIPRAZOLE 2 MG/1
2 TABLET ORAL DAILY
Qty: 90 TABLET | Refills: 1 | Status: SHIPPED | OUTPATIENT
Start: 2024-06-24

## 2024-06-24 RX ORDER — DULOXETIN HYDROCHLORIDE 60 MG/1
60 CAPSULE, DELAYED RELEASE ORAL DAILY
Qty: 90 CAPSULE | Refills: 1 | Status: SHIPPED | OUTPATIENT
Start: 2024-06-24

## 2024-06-24 RX ORDER — CARBAMAZEPINE 100 MG/1
TABLET, EXTENDED RELEASE ORAL
COMMUNITY
End: 2024-06-24

## 2024-06-24 ASSESSMENT — PATIENT HEALTH QUESTIONNAIRE - PHQ9
SUM OF ALL RESPONSES TO PHQ QUESTIONS 1-9: 0
SUM OF ALL RESPONSES TO PHQ9 QUESTIONS 1 & 2: 0
SUM OF ALL RESPONSES TO PHQ QUESTIONS 1-9: 0
SUM OF ALL RESPONSES TO PHQ QUESTIONS 1-9: 0
1. LITTLE INTEREST OR PLEASURE IN DOING THINGS: NOT AT ALL
SUM OF ALL RESPONSES TO PHQ QUESTIONS 1-9: 0
2. FEELING DOWN, DEPRESSED OR HOPELESS: NOT AT ALL

## 2024-06-24 NOTE — PROGRESS NOTES
Medicare Annual Wellness Visit    Ernesto Candelaria is here for Medicare AWV    Assessment & Plan   Initial Medicare annual wellness visit  Personal history of tobacco use  -     [place] VISIT TO DISCUSS LUNG CA SCREEN W LDCT  -     CT Lung Screen (Initial/Annual/Baseline); Future  Recommendations for Preventive Services Due: see orders and patient instructions/AVS.  Recommended screening schedule for the next 5-10 years is provided to the patient in written form: see Patient Instructions/AVS.  - Declining any vaccines today.  Declines AAA screening.  Cologuard was good.  Will schedule low-dose CT scan sometime in the future.  He is down to 2 to 3 cigarettes daily and working on quitting.  Mood has improved significantly since starting Abilify.     No follow-ups on file.     Subjective   The following acute and/or chronic problems were also addressed today:  Would like to get a treadmill but is exercising regularly.  Is stretching regularly.  Getting recommended fruit and vegetable intake.  Mainly drinks water, has 1 glass of orange juice daily.  Ensure every other day.  Is taking a multivitamin and vitamin D.  No more falls.  No balance issues.  Was side effect to medication.  Has medical power of .  Oldest daughter is medical power of .  Will let us know if he wants a referral for ACP. Goes to the eye doctor every 2 years, hasn't been to the dentist recently.  No vision concerns today.  Has a dentist that he can schedule with.  He has some hearing concerns but declines intervention at this time.    Patient's complete Health Risk Assessment and screening values have been reviewed and are found in Flowsheets. The following problems were reviewed today and where indicated follow up appointments were made and/or referrals ordered.    Positive Risk Factor Screenings with Interventions:    Fall Risk:  Do you feel unsteady or are you worried about falling? : no  2 or more falls in past year?: no  Fall with injury

## 2024-07-29 ENCOUNTER — HOSPITAL ENCOUNTER (OUTPATIENT)
Age: 69
Discharge: HOME | End: 2024-07-29
Payer: MEDICARE

## 2024-07-29 ENCOUNTER — OFFICE VISIT (OUTPATIENT)
Age: 69
End: 2024-07-29
Payer: MEDICARE

## 2024-07-29 VITALS
HEART RATE: 74 BPM | WEIGHT: 154 LBS | RESPIRATION RATE: 12 BRPM | SYSTOLIC BLOOD PRESSURE: 128 MMHG | HEIGHT: 68 IN | OXYGEN SATURATION: 93 % | DIASTOLIC BLOOD PRESSURE: 80 MMHG | BODY MASS INDEX: 23.34 KG/M2

## 2024-07-29 DIAGNOSIS — G20.C PARKINSONISM, UNSPECIFIED PARKINSONISM TYPE (HCC): Primary | ICD-10-CM

## 2024-07-29 DIAGNOSIS — E61.1 IRON DEFICIENCY: ICD-10-CM

## 2024-07-29 DIAGNOSIS — G20.C PARKINSONISM, UNSPECIFIED PARKINSONISM TYPE (HCC): ICD-10-CM

## 2024-07-29 LAB — FERRITIN SERPL IA-MCNC: 87.9 NG/ML (ref 30–400)

## 2024-07-29 PROCEDURE — 3017F COLORECTAL CA SCREEN DOC REV: CPT | Performed by: PSYCHIATRY & NEUROLOGY

## 2024-07-29 PROCEDURE — G8427 DOCREV CUR MEDS BY ELIG CLIN: HCPCS | Performed by: PSYCHIATRY & NEUROLOGY

## 2024-07-29 PROCEDURE — 99214 OFFICE O/P EST MOD 30 MIN: CPT | Performed by: PSYCHIATRY & NEUROLOGY

## 2024-07-29 PROCEDURE — 1123F ACP DISCUSS/DSCN MKR DOCD: CPT | Performed by: PSYCHIATRY & NEUROLOGY

## 2024-07-29 PROCEDURE — 4004F PT TOBACCO SCREEN RCVD TLK: CPT | Performed by: PSYCHIATRY & NEUROLOGY

## 2024-07-29 PROCEDURE — 82728 ASSAY OF FERRITIN: CPT

## 2024-07-29 PROCEDURE — 36415 COLL VENOUS BLD VENIPUNCTURE: CPT

## 2024-07-29 PROCEDURE — G8420 CALC BMI NORM PARAMETERS: HCPCS | Performed by: PSYCHIATRY & NEUROLOGY

## 2024-07-29 NOTE — PATIENT INSTRUCTIONS
YOU MUST CONFIRM YOUR APPOINTMENT 1 DAY PRIOR OR IT WILL BE CANCELLED!!   Our office will call you 3 times the day prior to your appointment in an attempt to confirm.  Please return our call ASAP or confirm your appt through [address] no later than 3 pm the day before your appointment.  If we do not hear back from you by 3 pm to confirm, your appointment will be cancelled & someone will be added into that slot from our wait list.

## 2024-07-29 NOTE — PROGRESS NOTES
Neurology outpatient new visit    Patient name: Ernesto Candelaria      Chief Complaint:  Abnormal sensation throughout his body.    History of present illness:  This is a 69 years old right-handed male.  The patient is here for evaluation of the new onset of abnormal sensation throughout his body.  The onset was about few years ago.  The patient had significant low back pain at that time.  The patient was found to have multiple lumbosacral arthropathy.  The patient had decompressive laminectomy back in 2019.  Unfortunately, the patient did not get significant benefit.  The patient remains to have abnormal sensation on both legs.  Over time, the patient also noticed abnormal sensation on both hands and up to his neck.  The patient had multiple MRI in the past including MRI cervical spine, MRI brain and the latest MRI lumbar spine back in 2020 after the surgery.  There was no significant spinal stenosis or central canal stenosis or white matter changes to explain the abnormal sensation.  The patient also had normal iron studies recently.  The patient did not get ferritin level.  The patient also denies significant tremor or shuffling gait.  There is also no history of anosmia, or vivid dreams.  But the family reports significant constipation and clumsiness with his gait.  The patient was planned to follow-up with a movement specialist in the past.  The patient tried dopamine treatment from the previous neurologist without significant benefit.    Past medical history:    Past Medical History:   Diagnosis Date    CNS demyelinating disease (HCC)     Degenerative disc disease, lumbar     Intervertebral disc disorder     Peripheral arterial occlusive disease (HCC)     Proximal weakness of extremity     Sciatica of right side     Spinal stenosis of lumbar region at multiple levels     Spondylolisthesis at L5-S1 level     Tremor        Past surgical history:    Past Surgical History:   Procedure Laterality Date    HAND SURGERY

## 2024-09-06 ENCOUNTER — HOSPITAL ENCOUNTER (OUTPATIENT)
Age: 69
Discharge: HOME | End: 2024-09-06
Payer: MEDICARE

## 2024-09-06 ENCOUNTER — OFFICE VISIT (OUTPATIENT)
Age: 69
End: 2024-09-06

## 2024-09-06 VITALS
SYSTOLIC BLOOD PRESSURE: 123 MMHG | WEIGHT: 161 LBS | HEART RATE: 64 BPM | DIASTOLIC BLOOD PRESSURE: 77 MMHG | HEIGHT: 68 IN | OXYGEN SATURATION: 98 % | BODY MASS INDEX: 24.4 KG/M2

## 2024-09-06 DIAGNOSIS — G62.9 SMALL FIBER NEUROPATHY: Primary | ICD-10-CM

## 2024-09-06 DIAGNOSIS — G62.9 SMALL FIBER NEUROPATHY: ICD-10-CM

## 2024-09-06 DIAGNOSIS — M79.2 NEUROPATHIC PAIN: ICD-10-CM

## 2024-09-06 LAB
IGA SERPL-MCNC: 251 MG/DL (ref 70–400)
PROT UR-MCNC: 0.01 G/DL
PROT UR-MCNC: 6.31 MG/DL
RPT COMMENT: NORMAL

## 2024-09-06 PROCEDURE — 36415 COLL VENOUS BLD VENIPUNCTURE: CPT

## 2024-09-06 PROCEDURE — 82784 ASSAY IGA/IGD/IGG/IGM EACH: CPT

## 2024-09-06 PROCEDURE — 83516 IMMUNOASSAY NONANTIBODY: CPT

## 2024-09-06 PROCEDURE — 84156 ASSAY OF PROTEIN URINE: CPT

## 2024-09-06 PROCEDURE — 84166 PROTEIN E-PHORESIS/URINE/CSF: CPT

## 2024-09-06 PROCEDURE — 83521 IG LIGHT CHAINS FREE EACH: CPT

## 2024-09-06 RX ORDER — DULOXETIN HYDROCHLORIDE 60 MG/1
60 CAPSULE, DELAYED RELEASE ORAL DAILY
Qty: 90 CAPSULE | Refills: 3 | Status: SHIPPED | OUTPATIENT
Start: 2024-09-06 | End: 2025-09-01

## 2024-09-06 RX ORDER — DULOXETIN HYDROCHLORIDE 30 MG/1
30 CAPSULE, DELAYED RELEASE ORAL DAILY
Qty: 30 CAPSULE | Refills: 11 | Status: SHIPPED | OUTPATIENT
Start: 2024-09-06 | End: 2025-09-01

## 2024-09-06 RX ORDER — PREGABALIN 150 MG/1
150 CAPSULE ORAL 3 TIMES DAILY
Qty: 270 CAPSULE | Refills: 1 | Status: SHIPPED | OUTPATIENT
Start: 2024-09-06 | End: 2025-03-05

## 2024-09-06 NOTE — PROGRESS NOTES
including impulsivity, negativity. Never had depression before 12/2023. He was prescribed Abilify ~3/2024 that resulted in improved activity, desire, motivation at home, hygiene, appetite, weight gain. Mood is much better now. He was diagnosed with parkinsonism prior to Abilify exposure and he previously tried levodopa maximum dose 600mg/day without benefit. He used to drink alcohol but it has been many years since he drank more than a few beers per week. No chemotherapy. No tremor. Denies anosmia. No observed dream enactment behaviors. He fell once in the setting of hyponatremia down the stairs. His balance was very poor at the time veering and weaving back and forth but this imbalance resolved after hyponatremia resolved.     Autonomic symptoms: +constipation, +erectile dysfunction, +dry eyes (occasional requiring eye drops sometimes), +urinary urgency (occasional). He says he has anhidrosis but family disagrees. Denies dry mouth, dysgeusia, photophobia, syncope, urinary incontinence.        Review of Systems   All other systems reviewed and are negative.         Objective   Neurologic Exam     Mental Status   Oriented to person, place, and time.   Follows 1 step commands.   Attention: normal. Concentration: normal.   Speech: speech is normal   Level of consciousness: alert  Knowledge: consistent with education.   Able to name object. Able to repeat. Normal comprehension.   Able to perform Luriat maneuver    No ideomotor apraxia     Cranial Nerves     CN II   Visual fields full to confrontation.     CN III, IV, VI   Pupils are equal, round, and reactive to light.  Extraocular motions are normal.   Nystagmus: none     CN V   Facial sensation intact.     CN VII   Facial expression full, symmetric.     CN VIII   Hearing: intact    CN IX, X   Palate: symmetric    CN XI   Right sternocleidomastoid strength: normal  Left sternocleidomastoid strength: normal  Right trapezius strength: normal  Left trapezius strength:

## 2024-09-07 LAB — TISSUE TRANSGLUTAMINASE IGA: 0.5 U/ML (ref 0–14)

## 2024-09-07 NOTE — ASSESSMENT & PLAN NOTE
Chronic, not at goal (unstable), changes made today: increase duloxetine for neuropathic pain. He has gradual progressive painful paresthesia generalized save for arms/hands with mild autonomic symptoms (constipation, erectile dysfunction, dry eyes, urinary urgency, possible anhidrosis lifelong) but no objective evidence of dysautonomia. Neurological exam is normal. This is consistent with idiopathic small fiber neuropathy. Extensive work up has been unremarkable to date. Given lack of prominent dysautonomia and temporal course, additional invasive testing is low yield. Extensive testing has been unrevealing to date (see below). I am checking UPEP and serum kappa/lambda which is not invasive and is more sensitive for light chain monoclonal gammopathy. Of note, he was previously diagnosed with parkinsonism but has no parkinsonism on exam despite now taking a neuroleptic (aripiprazole) for depression (now much improved). I suspect that what was previously called parkinsonism was instead generalized slowness due to depressive episode. Also, previous gait imbalance was likely due to hyponatremia rather than persistent cerebellar ataxia. He does not have clinical evidence or prodromal features of multiple system atrophy.

## 2024-09-07 NOTE — ASSESSMENT & PLAN NOTE
Chronic, not at goal (unstable), changes made today: increase duloxetine from 60mg/d to 90mg/d, continue Lyrica 150mg TID. He previously failed either carbamazepine or oxcarbazepine due to tolerability issues (hyponatremia). Distribution of neuropathic pain is too widespread to consider topical agent.

## 2024-09-09 LAB — PROT PATTERN UR ELPH-IMP: NORMAL

## 2024-09-10 LAB
KAPPA LC FREE SER-MCNC: 26.5 MG/L (ref 2.37–20.73)
KAPPA LC FREE/LAMBDA FREE SER: 1.62 {RATIO} (ref 0.22–1.74)
LAMBDA LC FREE SERPL-MCNC: 16.4 MG/L (ref 4.23–27.69)
RPT COMMENT: ABNORMAL

## 2024-10-25 ENCOUNTER — HOSPITAL ENCOUNTER (OUTPATIENT)
Dept: CT IMAGING | Age: 69
Discharge: HOME | End: 2024-10-25
Attending: STUDENT IN AN ORGANIZED HEALTH CARE EDUCATION/TRAINING PROGRAM
Payer: MEDICARE

## 2024-10-25 DIAGNOSIS — Z87.891 PERSONAL HISTORY OF TOBACCO USE: ICD-10-CM

## 2024-10-25 PROCEDURE — 71271 CT THORAX LUNG CANCER SCR C-: CPT

## 2024-11-04 ENCOUNTER — TELEPHONE (OUTPATIENT)
Dept: FAMILY MEDICINE CLINIC | Age: 69
End: 2024-11-04

## 2024-11-04 NOTE — TELEPHONE ENCOUNTER
-Moderate to severe emphysema involves the bilateral  lungs with biapical and bibasilar scarring.    Pts wife states that she was unaware that the patient had this condition going on. Would like to know if this is something that is getting worse compared to his last CT. She would like to know if there is anything that can be done for t his. He does  not complain of any trouble breathing but is a smoker. Should he be doing an inhaler?     Pt would also like to go ahead and proceed with the atorvastatin, please send to the pharmacy.     Pt aware that  is out of the office today.

## 2024-11-06 DIAGNOSIS — I25.10 CORONARY ARTERY CALCIFICATION: Primary | ICD-10-CM

## 2024-11-06 RX ORDER — ATORVASTATIN CALCIUM 20 MG/1
20 TABLET, FILM COATED ORAL DAILY
Qty: 90 TABLET | Refills: 0 | Status: SHIPPED | OUTPATIENT
Start: 2024-11-06

## 2024-11-06 NOTE — PROGRESS NOTES
Based on findings of lung CT with coronary artery artery calcifications statin ordered and follow-up labs ordered

## 2024-12-13 ENCOUNTER — OFFICE VISIT (OUTPATIENT)
Age: 69
End: 2024-12-13
Payer: MEDICARE

## 2024-12-13 VITALS
SYSTOLIC BLOOD PRESSURE: 175 MMHG | DIASTOLIC BLOOD PRESSURE: 107 MMHG | HEIGHT: 68 IN | HEART RATE: 98 BPM | BODY MASS INDEX: 25.58 KG/M2 | OXYGEN SATURATION: 93 % | WEIGHT: 168.8 LBS

## 2024-12-13 DIAGNOSIS — G62.9 SMALL FIBER NEUROPATHY: Primary | ICD-10-CM

## 2024-12-13 DIAGNOSIS — G20.C PARKINSONISM, UNSPECIFIED PARKINSONISM TYPE (HCC): ICD-10-CM

## 2024-12-13 PROBLEM — M79.2 NEUROPATHIC PAIN: Status: RESOLVED | Noted: 2024-09-06 | Resolved: 2024-12-13

## 2024-12-13 PROCEDURE — G8427 DOCREV CUR MEDS BY ELIG CLIN: HCPCS | Performed by: STUDENT IN AN ORGANIZED HEALTH CARE EDUCATION/TRAINING PROGRAM

## 2024-12-13 PROCEDURE — 99214 OFFICE O/P EST MOD 30 MIN: CPT | Performed by: STUDENT IN AN ORGANIZED HEALTH CARE EDUCATION/TRAINING PROGRAM

## 2024-12-13 PROCEDURE — G8419 CALC BMI OUT NRM PARAM NOF/U: HCPCS | Performed by: STUDENT IN AN ORGANIZED HEALTH CARE EDUCATION/TRAINING PROGRAM

## 2024-12-13 PROCEDURE — 1123F ACP DISCUSS/DSCN MKR DOCD: CPT | Performed by: STUDENT IN AN ORGANIZED HEALTH CARE EDUCATION/TRAINING PROGRAM

## 2024-12-13 PROCEDURE — 4004F PT TOBACCO SCREEN RCVD TLK: CPT | Performed by: STUDENT IN AN ORGANIZED HEALTH CARE EDUCATION/TRAINING PROGRAM

## 2024-12-13 PROCEDURE — G8484 FLU IMMUNIZE NO ADMIN: HCPCS | Performed by: STUDENT IN AN ORGANIZED HEALTH CARE EDUCATION/TRAINING PROGRAM

## 2024-12-13 PROCEDURE — 1159F MED LIST DOCD IN RCRD: CPT | Performed by: STUDENT IN AN ORGANIZED HEALTH CARE EDUCATION/TRAINING PROGRAM

## 2024-12-13 PROCEDURE — 3017F COLORECTAL CA SCREEN DOC REV: CPT | Performed by: STUDENT IN AN ORGANIZED HEALTH CARE EDUCATION/TRAINING PROGRAM

## 2024-12-13 NOTE — ASSESSMENT & PLAN NOTE
Chronic, not at goal (unstable), changes made today: RF, anti CCP, ESR, CRP. 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 Stop duloxetine and trial amitriptyline today.

## 2024-12-13 NOTE — PATIENT INSTRUCTIONS
Stop taking Cymbalta for 1 week, then start taking amitriptyline 1/2 tablet at bedtime for 1 week then take 1 tablet at bedtime (goal dose). Continue this goal dose for at least 4 weeks and if well tolerated but pain is uncontrolled, then we could try increasing the dose.

## 2024-12-13 NOTE — ASSESSMENT & PLAN NOTE
Chronic, not at goal (unstable), changes made today: ordered DaTScan. Mild parkinsonism on exam today but atypical parkinsonism (wide base) and prominent neuropathic pain. Parkinsonism could also be caused by neuroleptic exposure.

## 2024-12-13 NOTE — PROGRESS NOTES
3+  Right achilles: 2+  Left achilles: 2+  Right plantar: normal  Left plantar: normal  Right ankle clonus: absent  Left ankle clonus: absent  Arlyn normal BUE and RLE. Slight/questionable bradykinesia LLE foot tapping only.           On this date 12/13/2024 I have spent 37 minutes reviewing previous notes, test results and face to face with the patient discussing the diagnosis and importance of compliance with the treatment plan as well as documenting on the day of the visit.

## 2025-01-03 RX ORDER — ARIPIPRAZOLE 2 MG/1
2 TABLET ORAL DAILY
Qty: 90 TABLET | Refills: 0 | Status: SHIPPED | OUTPATIENT
Start: 2025-01-03

## 2025-01-07 ASSESSMENT — PATIENT HEALTH QUESTIONNAIRE - PHQ9
SUM OF ALL RESPONSES TO PHQ QUESTIONS 1-9: 7
7. TROUBLE CONCENTRATING ON THINGS, SUCH AS READING THE NEWSPAPER OR WATCHING TELEVISION: NOT AT ALL
SUM OF ALL RESPONSES TO PHQ9 QUESTIONS 1 & 2: 2
3. TROUBLE FALLING OR STAYING ASLEEP: SEVERAL DAYS
4. FEELING TIRED OR HAVING LITTLE ENERGY: SEVERAL DAYS
9. THOUGHTS THAT YOU WOULD BE BETTER OFF DEAD, OR OF HURTING YOURSELF: NOT AT ALL
6. FEELING BAD ABOUT YOURSELF - OR THAT YOU ARE A FAILURE OR HAVE LET YOURSELF OR YOUR FAMILY DOWN: SEVERAL DAYS
10. IF YOU CHECKED OFF ANY PROBLEMS, HOW DIFFICULT HAVE THESE PROBLEMS MADE IT FOR YOU TO DO YOUR WORK, TAKE CARE OF THINGS AT HOME, OR GET ALONG WITH OTHER PEOPLE: NOT DIFFICULT AT ALL
8. MOVING OR SPEAKING SO SLOWLY THAT OTHER PEOPLE COULD HAVE NOTICED. OR THE OPPOSITE - BEING SO FIDGETY OR RESTLESS THAT YOU HAVE BEEN MOVING AROUND A LOT MORE THAN USUAL: SEVERAL DAYS
10. IF YOU CHECKED OFF ANY PROBLEMS, HOW DIFFICULT HAVE THESE PROBLEMS MADE IT FOR YOU TO DO YOUR WORK, TAKE CARE OF THINGS AT HOME, OR GET ALONG WITH OTHER PEOPLE: NOT DIFFICULT AT ALL
8. MOVING OR SPEAKING SO SLOWLY THAT OTHER PEOPLE COULD HAVE NOTICED. OR THE OPPOSITE, BEING SO FIGETY OR RESTLESS THAT YOU HAVE BEEN MOVING AROUND A LOT MORE THAN USUAL: SEVERAL DAYS
5. POOR APPETITE OR OVEREATING: SEVERAL DAYS
1. LITTLE INTEREST OR PLEASURE IN DOING THINGS: SEVERAL DAYS
3. TROUBLE FALLING OR STAYING ASLEEP: SEVERAL DAYS
SUM OF ALL RESPONSES TO PHQ QUESTIONS 1-9: 7
2. FEELING DOWN, DEPRESSED OR HOPELESS: SEVERAL DAYS
SUM OF ALL RESPONSES TO PHQ QUESTIONS 1-9: 7
1. LITTLE INTEREST OR PLEASURE IN DOING THINGS: SEVERAL DAYS
SUM OF ALL RESPONSES TO PHQ QUESTIONS 1-9: 7
9. THOUGHTS THAT YOU WOULD BE BETTER OFF DEAD, OR OF HURTING YOURSELF: NOT AT ALL
4. FEELING TIRED OR HAVING LITTLE ENERGY: SEVERAL DAYS
SUM OF ALL RESPONSES TO PHQ QUESTIONS 1-9: 7
5. POOR APPETITE OR OVEREATING: SEVERAL DAYS
7. TROUBLE CONCENTRATING ON THINGS, SUCH AS READING THE NEWSPAPER OR WATCHING TELEVISION: NOT AT ALL
2. FEELING DOWN, DEPRESSED OR HOPELESS: SEVERAL DAYS
6. FEELING BAD ABOUT YOURSELF - OR THAT YOU ARE A FAILURE OR HAVE LET YOURSELF OR YOUR FAMILY DOWN: SEVERAL DAYS

## 2025-01-08 ENCOUNTER — OFFICE VISIT (OUTPATIENT)
Dept: FAMILY MEDICINE CLINIC | Age: 70
End: 2025-01-08

## 2025-01-08 VITALS
SYSTOLIC BLOOD PRESSURE: 124 MMHG | BODY MASS INDEX: 25.85 KG/M2 | DIASTOLIC BLOOD PRESSURE: 68 MMHG | HEART RATE: 72 BPM | OXYGEN SATURATION: 98 % | WEIGHT: 170 LBS

## 2025-01-08 DIAGNOSIS — R73.01 ELEVATED FASTING BLOOD SUGAR: ICD-10-CM

## 2025-01-08 DIAGNOSIS — I25.10 CORONARY ARTERY CALCIFICATION: ICD-10-CM

## 2025-01-08 DIAGNOSIS — J43.9 PULMONARY EMPHYSEMA, UNSPECIFIED EMPHYSEMA TYPE (HCC): ICD-10-CM

## 2025-01-08 DIAGNOSIS — E87.1 HYPONATREMIA: ICD-10-CM

## 2025-01-08 DIAGNOSIS — D64.9 ANEMIA, UNSPECIFIED TYPE: ICD-10-CM

## 2025-01-08 DIAGNOSIS — F33.1 DEPRESSION, MAJOR, RECURRENT, MODERATE (HCC): Primary | ICD-10-CM

## 2025-01-08 DIAGNOSIS — G20.C PARKINSONISM, UNSPECIFIED PARKINSONISM TYPE (HCC): ICD-10-CM

## 2025-01-08 LAB
ALBUMIN SERPL-MCNC: 4.1 G/DL (ref 3.4–5)
ALBUMIN/GLOB SERPL: 1.6 {RATIO} (ref 1.1–2.2)
ALP SERPL-CCNC: 114 U/L (ref 40–129)
ALT SERPL-CCNC: 42 U/L (ref 10–40)
ANION GAP SERPL CALCULATED.3IONS-SCNC: 13 MMOL/L (ref 3–16)
AST SERPL-CCNC: 41 U/L (ref 15–37)
BASOPHILS # BLD: 0.1 K/UL (ref 0–0.2)
BASOPHILS NFR BLD: 1 %
BILIRUB SERPL-MCNC: 0.4 MG/DL (ref 0–1)
BUN SERPL-MCNC: 18 MG/DL (ref 7–20)
CALCIUM SERPL-MCNC: 9.4 MG/DL (ref 8.3–10.6)
CHLORIDE SERPL-SCNC: 105 MMOL/L (ref 99–110)
CHOLEST SERPL-MCNC: 136 MG/DL (ref 0–199)
CO2 SERPL-SCNC: 25 MMOL/L (ref 21–32)
CREAT SERPL-MCNC: 0.8 MG/DL (ref 0.8–1.3)
DEPRECATED RDW RBC AUTO: 14.2 % (ref 12.4–15.4)
EOSINOPHIL # BLD: 0 K/UL (ref 0–0.6)
EOSINOPHIL NFR BLD: 0.5 %
FERRITIN SERPL IA-MCNC: 233 NG/ML (ref 30–400)
GFR SERPLBLD CREATININE-BSD FMLA CKD-EPI: >90 ML/MIN/{1.73_M2}
GLUCOSE SERPL-MCNC: 108 MG/DL (ref 70–99)
HCT VFR BLD AUTO: 43.9 % (ref 40.5–52.5)
HDLC SERPL-MCNC: 46 MG/DL (ref 40–60)
HGB BLD-MCNC: 14.8 G/DL (ref 13.5–17.5)
IRON SATN MFR SERPL: 36 % (ref 20–50)
IRON SERPL-MCNC: 100 UG/DL (ref 59–158)
LDLC SERPL CALC-MCNC: 75 MG/DL
LYMPHOCYTES # BLD: 2.4 K/UL (ref 1–5.1)
LYMPHOCYTES NFR BLD: 27 %
MCH RBC QN AUTO: 31.1 PG (ref 26–34)
MCHC RBC AUTO-ENTMCNC: 33.7 G/DL (ref 31–36)
MCV RBC AUTO: 92.2 FL (ref 80–100)
MONOCYTES # BLD: 0.6 K/UL (ref 0–1.3)
MONOCYTES NFR BLD: 7.3 %
NEUTROPHILS # BLD: 5.7 K/UL (ref 1.7–7.7)
NEUTROPHILS NFR BLD: 64.2 %
PLATELET # BLD AUTO: 291 K/UL (ref 135–450)
PMV BLD AUTO: 9.6 FL (ref 5–10.5)
POTASSIUM SERPL-SCNC: 4.7 MMOL/L (ref 3.5–5.1)
PROT SERPL-MCNC: 6.6 G/DL (ref 6.4–8.2)
RBC # BLD AUTO: 4.77 M/UL (ref 4.2–5.9)
SODIUM SERPL-SCNC: 143 MMOL/L (ref 136–145)
TIBC SERPL-MCNC: 281 UG/DL (ref 260–445)
TRIGL SERPL-MCNC: 73 MG/DL (ref 0–150)
VLDLC SERPL CALC-MCNC: 15 MG/DL
WBC # BLD AUTO: 8.9 K/UL (ref 4–11)

## 2025-01-08 RX ORDER — ATORVASTATIN CALCIUM 20 MG/1
20 TABLET, FILM COATED ORAL DAILY
Qty: 90 TABLET | Refills: 0 | Status: SHIPPED | OUTPATIENT
Start: 2025-01-08

## 2025-01-08 ASSESSMENT — ENCOUNTER SYMPTOMS
RHINORRHEA: 0
COUGH: 1
SHORTNESS OF BREATH: 0

## 2025-01-08 NOTE — ASSESSMENT & PLAN NOTE
Chronic, at goal (stable), continue current treatment plan    Orders:    CBC with Auto Differential; Future    Comprehensive Metabolic Panel; Future    Iron and TIBC; Future    Ferritin; Future    CBC with Auto Differential    Comprehensive Metabolic Panel    Iron and TIBC    Ferritin    LIPID PANEL; Future    LIPID PANEL

## 2025-01-08 NOTE — ASSESSMENT & PLAN NOTE
Monitored by specialist- no acute findings meriting change in the plan    Orders:    CBC with Auto Differential; Future    Comprehensive Metabolic Panel; Future    Iron and TIBC; Future    Ferritin; Future    CBC with Auto Differential    Comprehensive Metabolic Panel    Iron and TIBC    Ferritin    LIPID PANEL; Future    LIPID PANEL

## 2025-01-09 DIAGNOSIS — R73.01 ELEVATED FASTING BLOOD SUGAR: ICD-10-CM

## 2025-01-09 DIAGNOSIS — R73.03 PREDIABETES: Primary | ICD-10-CM

## 2025-01-09 LAB
EST. AVERAGE GLUCOSE BLD GHB EST-MCNC: 116.9 MG/DL
HBA1C MFR BLD: 5.7 %

## 2025-01-09 NOTE — PROGRESS NOTES
Lab Results   Component Value Date    INR 2.30 03/18/2024    INR 1.90 03/15/2024    INR 1.70 (A) 03/12/2024    PROTIME 15.3 (H) 06/14/2023    Atrial fibrillation   Range 2.0-3.0  6/5  Self test    Pt is aware of results and instructions.   
Normal, continue same dose, recheck in 1 week  
Spoke with patient and informed. A1C has been added.  
tablet nightly. 30 tablet 11    atorvastatin (LIPITOR) 20 MG tablet Take 1 tablet by mouth daily 90 tablet 0    pregabalin (LYRICA) 150 MG capsule Take 1 capsule by mouth in the morning, at noon, and at bedtime for 180 days. Max Daily Amount: 450 mg 270 capsule 1    HYDROcodone-acetaminophen (NORCO)  MG per tablet TAKE 1 TABLET BY MOUTH THREE TIMES DAILY AS NEEDED      aspirin 81 MG EC tablet Take 1 tablet by mouth daily      calcium carbonate (OSCAL) 500 MG TABS tablet Take 1 tablet by mouth daily      Multiple Vitamins-Minerals (CENTRUM SILVER PO) Take by mouth       No current facility-administered medications on file prior to visit.      Past Medical History:   Diagnosis Date    Degenerative disc disease, lumbar     Hearing loss     Intervertebral disc disorder     Peripheral arterial occlusive disease (HCC)     Sciatica of right side     Spinal stenosis of lumbar region at multiple levels     Spondylolisthesis at L5-S1 level      Patient Active Problem List   Diagnosis    Cerebral microvascular disease    Chronic pain disorder    Intervertebral disc disorders with radiculopathy, lumbosacral region    Pulmonary emphysema (HCC)    Foraminal stenosis of cervical region    Paresthesia of upper and lower extremities of both sides    Memory problem    Anemia    Cerebral atherosclerosis    Small fiber neuropathy    Parkinsonism (HCC)       PE  Vitals:    01/08/25 1056   BP: 124/68   Site: Left Upper Arm   Position: Sitting   Pulse: 72   SpO2: 98%   Weight: 77.1 kg (170 lb)     Estimated body mass index is 25.85 kg/m² as calculated from the following:    Height as of 12/13/24: 1.727 m (5' 8\").    Weight as of this encounter: 77.1 kg (170 lb).    Physical Exam  Constitutional:       General: He is not in acute distress.     Appearance: Normal appearance. He is normal weight.   HENT:      Head: Normocephalic and atraumatic.      Right Ear: Tympanic membrane, ear canal and external ear normal.      Left Ear:

## 2025-02-18 DIAGNOSIS — I25.10 CORONARY ARTERY CALCIFICATION: ICD-10-CM

## 2025-02-18 RX ORDER — ATORVASTATIN CALCIUM 20 MG/1
20 TABLET, FILM COATED ORAL DAILY
Qty: 30 TABLET | Refills: 0 | Status: SHIPPED | OUTPATIENT
Start: 2025-02-18 | End: 2025-03-20

## 2025-02-20 ENCOUNTER — HOSPITAL ENCOUNTER (OUTPATIENT)
Age: 70
Discharge: HOME | End: 2025-02-20
Payer: MEDICARE

## 2025-02-20 ENCOUNTER — HOSPITAL ENCOUNTER (OUTPATIENT)
Dept: NUCLEAR MEDICINE | Age: 70
Discharge: HOME | End: 2025-02-20
Attending: STUDENT IN AN ORGANIZED HEALTH CARE EDUCATION/TRAINING PROGRAM
Payer: MEDICARE

## 2025-02-20 DIAGNOSIS — G62.9 SMALL FIBER NEUROPATHY: ICD-10-CM

## 2025-02-20 DIAGNOSIS — G20.C PARKINSONISM, UNSPECIFIED PARKINSONISM TYPE (HCC): ICD-10-CM

## 2025-02-20 LAB
CRP SERPL-MCNC: 14.6 MG/L (ref 0–5.1)
ERYTHROCYTE [SEDIMENTATION RATE] IN BLOOD BY WESTERGREN METHOD: 11 MM/HR (ref 0–20)
RHEUMATOID FACT SER IA-ACNC: <10 IU/ML

## 2025-02-20 PROCEDURE — 85652 RBC SED RATE AUTOMATED: CPT

## 2025-02-20 PROCEDURE — A9584 IODINE I-123 IOFLUPANE: HCPCS | Performed by: STUDENT IN AN ORGANIZED HEALTH CARE EDUCATION/TRAINING PROGRAM

## 2025-02-20 PROCEDURE — 3430000000 HC RX DIAGNOSTIC RADIOPHARMACEUTICAL: Performed by: STUDENT IN AN ORGANIZED HEALTH CARE EDUCATION/TRAINING PROGRAM

## 2025-02-20 PROCEDURE — 86431 RHEUMATOID FACTOR QUANT: CPT

## 2025-02-20 PROCEDURE — 6370000000 HC RX 637 (ALT 250 FOR IP): Performed by: STUDENT IN AN ORGANIZED HEALTH CARE EDUCATION/TRAINING PROGRAM

## 2025-02-20 PROCEDURE — 86140 C-REACTIVE PROTEIN: CPT

## 2025-02-20 PROCEDURE — 86200 CCP ANTIBODY: CPT

## 2025-02-20 PROCEDURE — 36415 COLL VENOUS BLD VENIPUNCTURE: CPT

## 2025-02-20 PROCEDURE — 78803 RP LOCLZJ TUM SPECT 1 AREA: CPT

## 2025-02-20 RX ORDER — IODINE SOLUTION STRONG 5% (LUGOL'S) 5 %
0.2 SOLUTION ORAL
Status: COMPLETED | OUTPATIENT
Start: 2025-02-20 | End: 2025-02-20

## 2025-02-20 RX ADMIN — IOFLUPANE I-123 4.5 MILLICURIE: 2 INJECTION, SOLUTION INTRAVENOUS at 10:27

## 2025-02-20 RX ADMIN — IODINE SOLUTION STRONG 5% (LUGOL'S) 0.2 ML: 5 SOLUTION at 09:15

## 2025-02-21 LAB — CCP IGG SERPL-ACNC: <0.5 U/ML (ref 0–2.9)

## 2025-03-03 ENCOUNTER — OFFICE VISIT (OUTPATIENT)
Age: 70
End: 2025-03-03
Payer: MEDICARE

## 2025-03-03 VITALS
SYSTOLIC BLOOD PRESSURE: 135 MMHG | OXYGEN SATURATION: 93 % | DIASTOLIC BLOOD PRESSURE: 87 MMHG | HEIGHT: 68 IN | BODY MASS INDEX: 27.74 KG/M2 | WEIGHT: 183 LBS | HEART RATE: 67 BPM

## 2025-03-03 DIAGNOSIS — G62.9 SMALL FIBER NEUROPATHY: ICD-10-CM

## 2025-03-03 DIAGNOSIS — G20.A1 PARKINSON'S DISEASE WITHOUT DYSKINESIA OR FLUCTUATING MANIFESTATIONS (HCC): Primary | ICD-10-CM

## 2025-03-03 DIAGNOSIS — H91.90 HEARING LOSS, UNSPECIFIED HEARING LOSS TYPE, UNSPECIFIED LATERALITY: ICD-10-CM

## 2025-03-03 PROCEDURE — 4004F PT TOBACCO SCREEN RCVD TLK: CPT | Performed by: STUDENT IN AN ORGANIZED HEALTH CARE EDUCATION/TRAINING PROGRAM

## 2025-03-03 PROCEDURE — G8419 CALC BMI OUT NRM PARAM NOF/U: HCPCS | Performed by: STUDENT IN AN ORGANIZED HEALTH CARE EDUCATION/TRAINING PROGRAM

## 2025-03-03 PROCEDURE — G8427 DOCREV CUR MEDS BY ELIG CLIN: HCPCS | Performed by: STUDENT IN AN ORGANIZED HEALTH CARE EDUCATION/TRAINING PROGRAM

## 2025-03-03 PROCEDURE — 3017F COLORECTAL CA SCREEN DOC REV: CPT | Performed by: STUDENT IN AN ORGANIZED HEALTH CARE EDUCATION/TRAINING PROGRAM

## 2025-03-03 PROCEDURE — 99214 OFFICE O/P EST MOD 30 MIN: CPT | Performed by: STUDENT IN AN ORGANIZED HEALTH CARE EDUCATION/TRAINING PROGRAM

## 2025-03-03 PROCEDURE — 1159F MED LIST DOCD IN RCRD: CPT | Performed by: STUDENT IN AN ORGANIZED HEALTH CARE EDUCATION/TRAINING PROGRAM

## 2025-03-03 PROCEDURE — 1123F ACP DISCUSS/DSCN MKR DOCD: CPT | Performed by: STUDENT IN AN ORGANIZED HEALTH CARE EDUCATION/TRAINING PROGRAM

## 2025-03-03 NOTE — PROGRESS NOTES
results and face to face with the patient discussing the diagnosis and importance of compliance with the treatment plan as well as documenting on the day of the visit.

## 2025-03-03 NOTE — PATIENT INSTRUCTIONS
These are very useful online resources for Parkinson's disease:   <https://www.parkinson.org/>  <https://www.apdaparkinson.org/>    At these websites you can learn more about treatment options, common symptoms, tools used to track symptoms, patient support groups, and ongoing research involving treatment of Parkinson's disease.     This book is a comprehensive guide to understanding and managing your condition. I highly recommend this book:  Life With Parkinson's : 50 Facts & More - The PD  by [employer]    You can find this book online at this website:   <[email or web address].[email or web address]    If you have an Apple watch and are interested in technology, then I recommend downloading this mary to help track your symptoms:   StrivePD    You can learn more about this symptom tracking mary at this website:   <[email or web address]/>

## 2025-03-17 DIAGNOSIS — I25.10 CORONARY ARTERY CALCIFICATION: ICD-10-CM

## 2025-03-17 RX ORDER — ATORVASTATIN CALCIUM 20 MG/1
20 TABLET, FILM COATED ORAL DAILY
Qty: 30 TABLET | Refills: 0 | Status: SHIPPED | OUTPATIENT
Start: 2025-03-17 | End: 2025-04-16

## 2025-04-21 RX ORDER — ARIPIPRAZOLE 2 MG/1
2 TABLET ORAL DAILY
Qty: 90 TABLET | Refills: 0 | OUTPATIENT
Start: 2025-04-21

## 2025-04-22 DIAGNOSIS — I25.10 CORONARY ARTERY CALCIFICATION: ICD-10-CM

## 2025-04-22 RX ORDER — ARIPIPRAZOLE 2 MG/1
2 TABLET ORAL DAILY
Qty: 90 TABLET | Refills: 0 | OUTPATIENT
Start: 2025-04-22

## 2025-04-22 RX ORDER — ATORVASTATIN CALCIUM 20 MG/1
20 TABLET, FILM COATED ORAL DAILY
Qty: 30 TABLET | Refills: 0 | OUTPATIENT
Start: 2025-04-22 | End: 2025-05-22

## 2025-04-23 ENCOUNTER — PROCEDURE VISIT (OUTPATIENT)
Dept: AUDIOLOGY | Age: 70
End: 2025-04-23
Payer: MEDICARE

## 2025-04-23 DIAGNOSIS — H93.13 TINNITUS OF BOTH EARS: ICD-10-CM

## 2025-04-23 DIAGNOSIS — H90.3 SENSORINEURAL HEARING LOSS, BILATERAL: Primary | ICD-10-CM

## 2025-04-23 PROCEDURE — 92557 COMPREHENSIVE HEARING TEST: CPT | Performed by: AUDIOLOGIST

## 2025-04-23 PROCEDURE — 92567 TYMPANOMETRY: CPT | Performed by: AUDIOLOGIST

## 2025-04-23 NOTE — PROGRESS NOTES
Ernesto Candelaria   1955, 70 y.o. male   [id number]       Referring Provider:  Anam Richardson MD  Referral Type: In an order in Epic    Reason for Visit: Evaluation of the cause of disorders of hearing, tinnitus, or balance.    ADULT AUDIOLOGIC EVALUATION      Ernesto Candelaria is a 70 y.o. male seen today, 4/23/2025 , for an initial audiologic evaluation.  Patient was accompanied by spouse.    AUDIOLOGIC AND OTHER PERTINENT MEDICAL HISTORY:      Ernesto Candelaria noted tinnitus and history of head trauma.  Patient reports his wife perceives him to have difficulty hearing.  He noted intermittent bilateral tinnitus.  Medical history is reportedly significant for Parkinsons disease and head fracture in May 2023.     Ernesto Candelaria denied otalgia, aural fullness, dizziness, history of occupational/recreational noise exposure, history of ear surgery, and family history of hearing loss.    Date: 4/23/2025     IMPRESSIONS:      Normal middle ear pressure and compliance, bilaterally.  Abnormal hearing sensitivity, bilaterally, which can affect every day listening needs.  Word understanding was good presented at elevated sensation levels, bilaterally.  Hearing aids are recommended at this time.  Follow medical recommendations of  Anam Richardson MD .     ASSESSMENT AND FINDINGS:     Otoscopy revealed: Clear ear canals bilaterally    RIGHT EAR:  Hearing Sensitivity: Normal hearing sensitivity to a moderate sensorineural hearing loss from 250-8000 Hz  Speech Recognition Threshold: 40 dB HL  Word Recognition:Good (80%), based on NU-6 25-word list at 80 dBHL with 50 dBHL of masking using recorded speech stimuli.    Tympanometry: Normal peak pressure and compliance, Type A tympanogram, consistent with normal middle ear function.      LEFT EAR:  Hearing Sensitivity: Normal hearing sensitivity to a severe sensorineural hearing loss from 250-8000 Hz  Speech Recognition Threshold: 40 dB HL  Word Recognition: Good (80%), based on

## 2025-06-18 ENCOUNTER — PROCEDURE VISIT (OUTPATIENT)
Dept: AUDIOLOGY | Age: 70
End: 2025-06-18

## 2025-06-18 DIAGNOSIS — H90.3 SENSORINEURAL HEARING LOSS, BILATERAL: Primary | ICD-10-CM

## 2025-06-18 NOTE — PROGRESS NOTES
Ernesto Candelaria  1955.70 y.o. male   [id number]      HEARING AID EVALUATION    Ernesto Candelaria was seen today, 6/18/2025 , for a Hearing Aid Evaluation following audiologic evaluation on 04/23/2025.  Patient was accompanied by spouse.  Patient has no prior history of hearing aid use. Patient was found to have a discount plan through [employer] for hearing aids. Hearing aid benefit worksheet scanned into media tab.      DATE: 6/18/2025     PROCEDURES:       LIFESTYLE EVALUATION:      How do you spend most of your day? Workout at home everyother day, restaurant less than once a month with spouse, watch television     Which ear do you use on the phone? Do not use it often         Cell phone: Flip phone    Does your hearing problem cause you difficulty when listening to a TV or radio? Yes     Do others feel that your TV/radio is too loud? Yes     In what situation would you most like to hear better? In general hearing better      After a discussion of evaluation results, discussion of levels of technology and prices, and lifestyle assessment, Ernesto Candelaria has decided to pursue hearing aids.     Technology to be ordered: Safehis L90-R.  Picked color P7,  power medium,  length 2, medium vented [facility], .  Order number [id number]    Patient cost due at time of fitting: $2200.00 of total     PATIENT EDUCATION:      - Verbally reviewed benefits and limitations of devices and available features in hearing aids.    - Verbally discussed realistic expectations of hearing aid use     Information was shared verbally with patient during appointment.     RECOMMENDATIONS:      - Return in 2-3 weeks to be fit with hearing aids.      No charge for today's appointment.      Manan Polo  Audiologist

## 2025-07-10 ENCOUNTER — PROCEDURE VISIT (OUTPATIENT)
Dept: AUDIOLOGY | Age: 70
End: 2025-07-10

## 2025-07-10 DIAGNOSIS — H90.3 SENSORINEURAL HEARING LOSS, BILATERAL: Primary | ICD-10-CM

## 2025-07-10 PROCEDURE — V5261 HEARING AID, DIGIT, BIN, BTE: HCPCS | Performed by: AUDIOLOGIST

## 2025-07-10 PROCEDURE — V5020 CONFORMITY EVALUATION: HCPCS | Performed by: AUDIOLOGIST

## 2025-07-10 PROCEDURE — V5265 EAR MOLD/INSERT, DISP: HCPCS | Performed by: AUDIOLOGIST

## 2025-07-10 PROCEDURE — V5160 DISPENSING FEE BINAURAL: HCPCS | Performed by: AUDIOLOGIST

## 2025-07-10 NOTE — PROGRESS NOTES
Ernesto Candelaria   1955, 70 y.o. male   [id number]     HEARING AID FITTING      RIGHT EAR: /MODEL: Phonak Audeo L90-R  SN: [id number]  WIRE/DOME: 2M/Cap Dome    LEFT EAR: /MODEL: Phonak Audeo L90-R  SN: [id number]  WIRE/DOME: 2M/Cap Dome    Waxguard: CeriStop  HAF: 07/10/2025  WARRANTY: 2028    BATTERY: Rechargeable  BUTTONS: Volume Control  PROGRAMS: [employer]  PHONE CONNECTIVITY: None    Covered Services: 0 out of 3 visits used    Ernesto Candelaria was seen today, 7/10/2025   to be fit with Phonak Audeo L90-R hearing aids.  Programmed to 100% adaptation level.  Counseled patient on use and care of devices and on realistic expectations.      Device orientation was completed, includin. Hearing aid insertion/removal   2. Buttons/Indicators   3. Rechargeable battery and life expectancy     4. Hearing aid insertion/removal from      5. Cleaning/maintenance of the device     6. Loss & Damage/Repair warranty information   7. 30-day right-to-return period    Ernesto Candelaria noted good sound quality and comfort with hearing aids.  Patient demonstrated proper insertion and removal of devices in office.  Completed delivery statement and reviewed 30 day trial period and recall process for programming adjustments.      PATIENT EDUCATION:       Ernesto Candelaria was provided with the Hearing Aid Fitting Checklist as a reference of items discussed at today's appointment.  Patient may find additional product information in the provided hearing aid  device manual.    Information was shared verbally with patient during appointment.       RECOMMENDATIONS:     Return for follow-up appointment within 30-day right-to-return period.  Continue wearing both HAs during all waking hours.  Retest hearing as medically indicated and/or sooner if a change in hearing is noted.    Unbundled Billing- see associated fees and codes below:    Description Code Amount   Hearing Aids  $1776.50

## 2025-07-24 ENCOUNTER — PROCEDURE VISIT (OUTPATIENT)
Dept: AUDIOLOGY | Age: 70
End: 2025-07-24

## 2025-07-24 DIAGNOSIS — H90.3 SENSORINEURAL HEARING LOSS, BILATERAL: Primary | ICD-10-CM

## 2025-07-24 NOTE — PROGRESS NOTES
Ernesto Candelaria   1955, 70 y.o. male   [id number]     HEARING AID FITTING FOLLOW-UP    Ernesto Candelaria was seen today, 7/24/2025,  for first follow-up after being fit with Phonak Audeo L90-R hearing aids.  He has no concerns with the hearing aids at this time.    RIGHT EAR: /MODEL: Phonak Audeo L90-R  SN: [id number]  WIRE/DOME: 2M/Cap Dome    LEFT EAR: /MODEL: Phonak Audeo L90-R  SN: [id number]  WIRE/DOME: 2M/Cap Dome    Waxguard: CeriStop  HAF: 07/10/2025  WARRANTY: 07/22/2028     BATTERY: Rechargeable  BUTTONS: Volume Control  PROGRAMS: [email or web address]  PHONE CONNECTIVITY: None     Covered Services: 0 out of 3 visits used    PROCEDURES:     Live speech mapping was completed with a good match to NAL-NL2 targets from 250-8000 Hz, AU.  After adjustments patient reported overall good sound quality.    Reviewed cleaning and maintenance of hearing aids. Patient demonstrated she was able to change wax guards and domes in office.     Patient Education:       - Verbally and visually reviewed care and maintenance of devices.   - Reviewed Hearing Aid Fitting checklist and Troubleshooting document given to patient.     Information was shared verbally with patient during appointment.      RECOMMENDATIONS:     1. Continue wearing both HAs during all waking hours.  2. Return for follow-up as scheduled.  3. Retest hearing as medically indicated and/or sooner if a change in hearing is noted.    No charge for today's appointment.    Manan Polo  Audiologist

## 2025-07-29 SDOH — HEALTH STABILITY: PHYSICAL HEALTH: ON AVERAGE, HOW MANY DAYS PER WEEK DO YOU ENGAGE IN MODERATE TO STRENUOUS EXERCISE (LIKE A BRISK WALK)?: 3 DAYS

## 2025-07-29 SDOH — HEALTH STABILITY: PHYSICAL HEALTH: ON AVERAGE, HOW MANY MINUTES DO YOU ENGAGE IN EXERCISE AT THIS LEVEL?: 20 MIN

## 2025-07-30 ENCOUNTER — OFFICE VISIT (OUTPATIENT)
Dept: FAMILY MEDICINE CLINIC | Age: 70
End: 2025-07-30
Payer: MEDICARE

## 2025-07-30 VITALS
BODY MASS INDEX: 27.74 KG/M2 | DIASTOLIC BLOOD PRESSURE: 82 MMHG | HEART RATE: 68 BPM | WEIGHT: 183 LBS | OXYGEN SATURATION: 94 % | SYSTOLIC BLOOD PRESSURE: 126 MMHG | HEIGHT: 68 IN

## 2025-07-30 DIAGNOSIS — M51.17 INTERVERTEBRAL DISC DISORDERS WITH RADICULOPATHY, LUMBOSACRAL REGION: ICD-10-CM

## 2025-07-30 DIAGNOSIS — Z76.89 ENCOUNTER TO ESTABLISH CARE: Primary | ICD-10-CM

## 2025-07-30 DIAGNOSIS — F33.1 DEPRESSION, MAJOR, RECURRENT, MODERATE (HCC): ICD-10-CM

## 2025-07-30 DIAGNOSIS — Z12.5 PROSTATE CANCER SCREENING: ICD-10-CM

## 2025-07-30 DIAGNOSIS — I25.10 CORONARY ARTERY CALCIFICATION: ICD-10-CM

## 2025-07-30 DIAGNOSIS — R20.2 PARESTHESIA OF UPPER AND LOWER EXTREMITIES OF BOTH SIDES: ICD-10-CM

## 2025-07-30 PROCEDURE — 1160F RVW MEDS BY RX/DR IN RCRD: CPT | Performed by: NURSE PRACTITIONER

## 2025-07-30 PROCEDURE — G8419 CALC BMI OUT NRM PARAM NOF/U: HCPCS | Performed by: NURSE PRACTITIONER

## 2025-07-30 PROCEDURE — G8427 DOCREV CUR MEDS BY ELIG CLIN: HCPCS | Performed by: NURSE PRACTITIONER

## 2025-07-30 PROCEDURE — 1036F TOBACCO NON-USER: CPT | Performed by: NURSE PRACTITIONER

## 2025-07-30 PROCEDURE — 1159F MED LIST DOCD IN RCRD: CPT | Performed by: NURSE PRACTITIONER

## 2025-07-30 PROCEDURE — 3017F COLORECTAL CA SCREEN DOC REV: CPT | Performed by: NURSE PRACTITIONER

## 2025-07-30 PROCEDURE — 1123F ACP DISCUSS/DSCN MKR DOCD: CPT | Performed by: NURSE PRACTITIONER

## 2025-07-30 PROCEDURE — 99214 OFFICE O/P EST MOD 30 MIN: CPT | Performed by: NURSE PRACTITIONER

## 2025-07-30 RX ORDER — ARIPIPRAZOLE 2 MG/1
2 TABLET ORAL DAILY
Qty: 90 TABLET | Refills: 0 | Status: SHIPPED | OUTPATIENT
Start: 2025-07-30

## 2025-07-30 RX ORDER — ATORVASTATIN CALCIUM 20 MG/1
20 TABLET, FILM COATED ORAL DAILY
Qty: 30 TABLET | Refills: 2 | Status: SHIPPED | OUTPATIENT
Start: 2025-07-30 | End: 2025-10-28

## 2025-07-30 SDOH — ECONOMIC STABILITY: FOOD INSECURITY: WITHIN THE PAST 12 MONTHS, YOU WORRIED THAT YOUR FOOD WOULD RUN OUT BEFORE YOU GOT MONEY TO BUY MORE.: NEVER TRUE

## 2025-07-30 SDOH — ECONOMIC STABILITY: FOOD INSECURITY: WITHIN THE PAST 12 MONTHS, THE FOOD YOU BOUGHT JUST DIDN'T LAST AND YOU DIDN'T HAVE MONEY TO GET MORE.: NEVER TRUE

## 2025-07-30 NOTE — PROGRESS NOTES
PROGRESS NOTE  Date of Service:  7/30/2025    SUBJECTIVE:  Patient ID: Ernesto Candelaria is a 70 y.o. male    HPI: new patient exam  Former pt of Dr Eli  Followed by neurology audiology pain management   Labs 2/25 by neurol 1/25 Dr Platt   History of Present Illness  The patient is a 70-year-old male who presents to establish care.    He has a history of pulmonary emphysema and continues to smoke, having started at the age of 24. He reports no alcohol consumption or diagnosis of fatty liver. He has previously undergone colonoscopy and Cologuard tests. He maintains his ability to drive and uses glasses for vision correction. He engages in physical exercise three times a week and includes vegetables and chicken in his diet. His daily routine includes two cups of coffee in the morning, accompanied by three cigarettes, and another cigarette at supper time. He reports no drug use. He has a history of Parkinson's disease, cervical neck issues, lumbar problems, and brain-related conditions. He also experiences depression and chronic back pain that extends to his legs. He uses a hearing aid, which has improved his hearing. He has discontinued his daily baby aspirin 81 mg regimen and does not take calcium supplements. He takes a multivitamin supplement and pain medication, including hydrocodone and Tylenol, prescribed by his pain specialist whom he visits every two months. He also takes Lyrica, prescribed by his pain management doctor, which alleviates the burning sensation in his legs, particularly in his calves. He is seeking a refill of his Abilify 2 mg prescription as he has run out and did not schedule a follow-up appointment. He takes amitriptyline 25 mg, two tablets nightly. He does not take atorvastatin for cholesterol management. He has never consulted a cardiologist. He has had surgery on his hand.        Social History:  Diet: Includes vegetables and chicken  Alcohol: No alcohol consumption  Tobacco: Smokes

## 2025-07-31 ASSESSMENT — ENCOUNTER SYMPTOMS
BACK PAIN: 1
SHORTNESS OF BREATH: 0

## 2025-08-28 ENCOUNTER — OFFICE VISIT (OUTPATIENT)
Dept: NEUROLOGY | Age: 70
End: 2025-08-28
Payer: MEDICARE

## 2025-08-28 VITALS
SYSTOLIC BLOOD PRESSURE: 133 MMHG | WEIGHT: 185.3 LBS | DIASTOLIC BLOOD PRESSURE: 79 MMHG | HEART RATE: 64 BPM | OXYGEN SATURATION: 91 % | HEIGHT: 68 IN | BODY MASS INDEX: 28.08 KG/M2

## 2025-08-28 DIAGNOSIS — M79.2 NEUROPATHIC PAIN: ICD-10-CM

## 2025-08-28 DIAGNOSIS — G20.A1 PARKINSON'S DISEASE WITHOUT DYSKINESIA OR FLUCTUATING MANIFESTATIONS (HCC): ICD-10-CM

## 2025-08-28 DIAGNOSIS — G62.9 SMALL FIBER NEUROPATHY: Primary | ICD-10-CM

## 2025-08-28 PROCEDURE — 1123F ACP DISCUSS/DSCN MKR DOCD: CPT | Performed by: STUDENT IN AN ORGANIZED HEALTH CARE EDUCATION/TRAINING PROGRAM

## 2025-08-28 PROCEDURE — G8419 CALC BMI OUT NRM PARAM NOF/U: HCPCS | Performed by: STUDENT IN AN ORGANIZED HEALTH CARE EDUCATION/TRAINING PROGRAM

## 2025-08-28 PROCEDURE — 1159F MED LIST DOCD IN RCRD: CPT | Performed by: STUDENT IN AN ORGANIZED HEALTH CARE EDUCATION/TRAINING PROGRAM

## 2025-08-28 PROCEDURE — 3017F COLORECTAL CA SCREEN DOC REV: CPT | Performed by: STUDENT IN AN ORGANIZED HEALTH CARE EDUCATION/TRAINING PROGRAM

## 2025-08-28 PROCEDURE — 99214 OFFICE O/P EST MOD 30 MIN: CPT | Performed by: STUDENT IN AN ORGANIZED HEALTH CARE EDUCATION/TRAINING PROGRAM

## 2025-08-28 PROCEDURE — 1036F TOBACCO NON-USER: CPT | Performed by: STUDENT IN AN ORGANIZED HEALTH CARE EDUCATION/TRAINING PROGRAM

## 2025-08-28 PROCEDURE — G8427 DOCREV CUR MEDS BY ELIG CLIN: HCPCS | Performed by: STUDENT IN AN ORGANIZED HEALTH CARE EDUCATION/TRAINING PROGRAM

## 2025-08-28 RX ORDER — PREGABALIN 150 MG/1
150 CAPSULE ORAL 3 TIMES DAILY
Qty: 270 CAPSULE | Refills: 1 | Status: SHIPPED | OUTPATIENT
Start: 2025-08-28 | End: 2026-02-24

## 2025-08-28 RX ORDER — AMITRIPTYLINE HYDROCHLORIDE 75 MG/1
75 TABLET ORAL NIGHTLY
Qty: 30 TABLET | Refills: 11 | Status: SHIPPED | OUTPATIENT
Start: 2025-08-28 | End: 2026-08-23

## 2025-09-03 ENCOUNTER — TELEPHONE (OUTPATIENT)
Dept: ENT CLINIC | Age: 70
End: 2025-09-03